# Patient Record
Sex: FEMALE | Race: WHITE | Employment: OTHER | URBAN - METROPOLITAN AREA
[De-identification: names, ages, dates, MRNs, and addresses within clinical notes are randomized per-mention and may not be internally consistent; named-entity substitution may affect disease eponyms.]

---

## 2018-05-07 ENCOUNTER — INITIAL CONSULT (OUTPATIENT)
Dept: SURGERY | Age: 33
End: 2018-05-07

## 2018-05-07 VITALS
WEIGHT: 126 LBS | DIASTOLIC BLOOD PRESSURE: 73 MMHG | SYSTOLIC BLOOD PRESSURE: 128 MMHG | BODY MASS INDEX: 20.34 KG/M2 | HEART RATE: 88 BPM

## 2018-05-07 DIAGNOSIS — K41.90 FEMORAL HERNIA OF RIGHT SIDE: Primary | ICD-10-CM

## 2018-05-07 PROCEDURE — 99203 OFFICE O/P NEW LOW 30 MIN: CPT | Performed by: SURGERY

## 2018-05-08 ENCOUNTER — TELEPHONE (OUTPATIENT)
Dept: SURGERY | Age: 33
End: 2018-05-08

## 2018-05-11 ENCOUNTER — TELEPHONE (OUTPATIENT)
Dept: SURGERY | Age: 33
End: 2018-05-11

## 2018-05-14 ENCOUNTER — PAT TELEPHONE (OUTPATIENT)
Dept: PREADMISSION TESTING | Age: 33
End: 2018-05-14

## 2018-05-14 VITALS — BODY MASS INDEX: 20.25 KG/M2 | HEIGHT: 66 IN | WEIGHT: 126 LBS

## 2018-05-14 RX ORDER — TRAMADOL HYDROCHLORIDE 50 MG/1
50 TABLET ORAL EVERY 6 HOURS PRN
COMMUNITY
End: 2018-05-16 | Stop reason: HOSPADM

## 2018-05-14 RX ORDER — DEXTROAMPHETAMINE SACCHARATE, AMPHETAMINE ASPARTATE MONOHYDRATE, DEXTROAMPHETAMINE SULFATE AND AMPHETAMINE SULFATE 5; 5; 5; 5 MG/1; MG/1; MG/1; MG/1
20 CAPSULE, EXTENDED RELEASE ORAL EVERY MORNING
COMMUNITY
End: 2022-05-27

## 2018-05-14 ASSESSMENT — PAIN DESCRIPTION - LOCATION: LOCATION: ABDOMEN;BACK

## 2018-05-14 ASSESSMENT — PAIN - FUNCTIONAL ASSESSMENT: PAIN_FUNCTIONAL_ASSESSMENT: 0-10

## 2018-05-14 ASSESSMENT — PAIN SCALES - GENERAL: PAINLEVEL_OUTOF10: 5

## 2018-05-14 ASSESSMENT — PAIN DESCRIPTION - PAIN TYPE: TYPE: CHRONIC PAIN

## 2018-05-16 ENCOUNTER — HOSPITAL ENCOUNTER (OUTPATIENT)
Dept: SURGERY | Age: 33
Discharge: HOME | End: 2018-05-16
Attending: SURGERY | Admitting: SURGERY

## 2018-05-16 VITALS
RESPIRATION RATE: 18 BRPM | WEIGHT: 122.91 LBS | SYSTOLIC BLOOD PRESSURE: 112 MMHG | TEMPERATURE: 97.8 F | OXYGEN SATURATION: 100 % | DIASTOLIC BLOOD PRESSURE: 60 MMHG | HEART RATE: 54 BPM | HEIGHT: 66 IN | BODY MASS INDEX: 19.75 KG/M2

## 2018-05-16 DIAGNOSIS — K40.91 RECURRENT RIGHT INGUINAL HERNIA: Primary | ICD-10-CM

## 2018-05-16 LAB
ANION GAP SERPL CALCULATED.3IONS-SCNC: 12 MMOL/L (ref 3–16)
BUN BLDV-MCNC: 13 MG/DL (ref 7–20)
CALCIUM SERPL-MCNC: 8.9 MG/DL (ref 8.3–10.6)
CHLORIDE BLD-SCNC: 103 MMOL/L (ref 99–110)
CO2: 25 MMOL/L (ref 21–32)
CREAT SERPL-MCNC: 0.7 MG/DL (ref 0.6–1.1)
GFR AFRICAN AMERICAN: >60
GFR NON-AFRICAN AMERICAN: >60
GLUCOSE BLD-MCNC: 100 MG/DL (ref 70–99)
POTASSIUM REFLEX MAGNESIUM: 4.5 MMOL/L (ref 3.5–5.1)
PREGNANCY, URINE: NEGATIVE
SODIUM BLD-SCNC: 140 MMOL/L (ref 136–145)

## 2018-05-16 PROCEDURE — 49651 LAP ING HERNIA REPAIR RECUR: CPT | Performed by: SURGERY

## 2018-05-16 RX ORDER — MIDAZOLAM HYDROCHLORIDE 1 MG/ML
1 INJECTION INTRAMUSCULAR; INTRAVENOUS EVERY 5 MIN PRN
Status: DISCONTINUED | OUTPATIENT
Start: 2018-05-16 | End: 2018-05-17 | Stop reason: HOSPADM

## 2018-05-16 RX ORDER — FENTANYL CITRATE 50 UG/ML
50 INJECTION, SOLUTION INTRAMUSCULAR; INTRAVENOUS EVERY 5 MIN PRN
Status: DISCONTINUED | OUTPATIENT
Start: 2018-05-16 | End: 2018-05-17 | Stop reason: HOSPADM

## 2018-05-16 RX ORDER — SODIUM CHLORIDE 9 MG/ML
INJECTION, SOLUTION INTRAVENOUS CONTINUOUS
Status: DISCONTINUED | OUTPATIENT
Start: 2018-05-16 | End: 2018-05-17 | Stop reason: HOSPADM

## 2018-05-16 RX ORDER — ONDANSETRON 2 MG/ML
4 INJECTION INTRAMUSCULAR; INTRAVENOUS ONCE
Status: COMPLETED | OUTPATIENT
Start: 2018-05-16 | End: 2018-05-16

## 2018-05-16 RX ORDER — OXYCODONE HYDROCHLORIDE AND ACETAMINOPHEN 5; 325 MG/1; MG/1
1 TABLET ORAL
Status: COMPLETED | OUTPATIENT
Start: 2018-05-16 | End: 2018-05-16

## 2018-05-16 RX ORDER — SODIUM CHLORIDE 0.9 % (FLUSH) 0.9 %
10 SYRINGE (ML) INJECTION EVERY 12 HOURS SCHEDULED
Status: DISCONTINUED | OUTPATIENT
Start: 2018-05-16 | End: 2018-05-17 | Stop reason: HOSPADM

## 2018-05-16 RX ORDER — ONDANSETRON 4 MG/1
4 TABLET, FILM COATED ORAL EVERY 8 HOURS PRN
Qty: 15 TABLET | Refills: 0 | Status: SHIPPED | OUTPATIENT
Start: 2018-05-16 | End: 2022-03-24

## 2018-05-16 RX ORDER — CLONAZEPAM 0.5 MG/1
0.5 TABLET ORAL ONCE
Status: COMPLETED | OUTPATIENT
Start: 2018-05-16 | End: 2018-05-16

## 2018-05-16 RX ORDER — FENTANYL CITRATE 50 UG/ML
25 INJECTION, SOLUTION INTRAMUSCULAR; INTRAVENOUS EVERY 5 MIN PRN
Status: DISCONTINUED | OUTPATIENT
Start: 2018-05-16 | End: 2018-05-17 | Stop reason: HOSPADM

## 2018-05-16 RX ORDER — SODIUM CHLORIDE 0.9 % (FLUSH) 0.9 %
10 SYRINGE (ML) INJECTION PRN
Status: DISCONTINUED | OUTPATIENT
Start: 2018-05-16 | End: 2018-05-17 | Stop reason: HOSPADM

## 2018-05-16 RX ORDER — OXYCODONE HYDROCHLORIDE AND ACETAMINOPHEN 5; 325 MG/1; MG/1
1-2 TABLET ORAL EVERY 6 HOURS PRN
Qty: 28 TABLET | Refills: 0 | Status: SHIPPED | OUTPATIENT
Start: 2018-05-16 | End: 2018-05-23

## 2018-05-16 RX ORDER — MEPERIDINE HYDROCHLORIDE 25 MG/ML
12.5 INJECTION INTRAMUSCULAR; INTRAVENOUS; SUBCUTANEOUS EVERY 5 MIN PRN
Status: DISCONTINUED | OUTPATIENT
Start: 2018-05-16 | End: 2018-05-17 | Stop reason: HOSPADM

## 2018-05-16 RX ORDER — DOCUSATE SODIUM 100 MG/1
100 CAPSULE, LIQUID FILLED ORAL 2 TIMES DAILY PRN
Qty: 40 CAPSULE | Refills: 0 | Status: SHIPPED | OUTPATIENT
Start: 2018-05-16 | End: 2022-05-27

## 2018-05-16 RX ORDER — ONDANSETRON 2 MG/ML
4 INJECTION INTRAMUSCULAR; INTRAVENOUS
Status: COMPLETED | OUTPATIENT
Start: 2018-05-16 | End: 2018-05-16

## 2018-05-16 RX ADMIN — OXYCODONE HYDROCHLORIDE AND ACETAMINOPHEN 1 TABLET: 5; 325 TABLET ORAL at 15:05

## 2018-05-16 RX ADMIN — SODIUM CHLORIDE: 9 INJECTION, SOLUTION INTRAVENOUS at 10:18

## 2018-05-16 RX ADMIN — ONDANSETRON 4 MG: 2 INJECTION INTRAMUSCULAR; INTRAVENOUS at 13:56

## 2018-05-16 RX ADMIN — ONDANSETRON 4 MG: 2 INJECTION INTRAMUSCULAR; INTRAVENOUS at 15:01

## 2018-05-16 RX ADMIN — FENTANYL CITRATE 50 MCG: 50 INJECTION, SOLUTION INTRAMUSCULAR; INTRAVENOUS at 13:51

## 2018-05-16 RX ADMIN — CLONAZEPAM 0.5 MG: 0.5 TABLET ORAL at 15:44

## 2018-05-16 RX ADMIN — FENTANYL CITRATE 50 MCG: 50 INJECTION, SOLUTION INTRAMUSCULAR; INTRAVENOUS at 13:12

## 2018-05-16 ASSESSMENT — PAIN - FUNCTIONAL ASSESSMENT: PAIN_FUNCTIONAL_ASSESSMENT: 0-10

## 2018-05-16 ASSESSMENT — PAIN SCALES - GENERAL
PAINLEVEL_OUTOF10: 8
PAINLEVEL_OUTOF10: 6
PAINLEVEL_OUTOF10: 10
PAINLEVEL_OUTOF10: 6
PAINLEVEL_OUTOF10: 6
PAINLEVEL_OUTOF10: 7

## 2018-05-16 ASSESSMENT — PAIN DESCRIPTION - DESCRIPTORS: DESCRIPTORS: ACHING;JABBING

## 2018-05-16 ASSESSMENT — PAIN DESCRIPTION - LOCATION: LOCATION: ABDOMEN

## 2018-05-16 ASSESSMENT — PAIN DESCRIPTION - PAIN TYPE
TYPE: SURGICAL PAIN
TYPE: SURGICAL PAIN
TYPE: ACUTE PAIN;SURGICAL PAIN

## 2018-05-17 ENCOUNTER — TELEPHONE (OUTPATIENT)
Dept: SURGERY | Age: 33
End: 2018-05-17

## 2018-05-17 LAB
HCT VFR BLD CALC: 34.3 % (ref 36–48)
HEMATOLOGY PATH CONSULT: NORMAL
HEMATOLOGY PATH CONSULT: YES
HEMOGLOBIN: 11.5 G/DL (ref 12–16)
MCH RBC QN AUTO: 23.2 PG (ref 26–34)
MCHC RBC AUTO-ENTMCNC: 33.4 G/DL (ref 31–36)
MCV RBC AUTO: 69.4 FL (ref 80–100)
PDW BLD-RTO: 17.8 % (ref 12.4–15.4)
PLATELET # BLD: 217 K/UL (ref 135–450)
PMV BLD AUTO: 9.1 FL (ref 5–10.5)
RBC # BLD: 4.95 M/UL (ref 4–5.2)
WBC # BLD: 4.7 K/UL (ref 4–11)

## 2018-05-18 ENCOUNTER — TELEPHONE (OUTPATIENT)
Dept: SURGERY | Age: 33
End: 2018-05-18

## 2018-05-22 ENCOUNTER — TELEPHONE (OUTPATIENT)
Dept: SURGERY | Age: 33
End: 2018-05-22

## 2018-06-01 ENCOUNTER — OFFICE VISIT (OUTPATIENT)
Dept: SURGERY | Age: 33
End: 2018-06-01

## 2018-06-01 VITALS
SYSTOLIC BLOOD PRESSURE: 110 MMHG | HEART RATE: 72 BPM | DIASTOLIC BLOOD PRESSURE: 75 MMHG | BODY MASS INDEX: 20.01 KG/M2 | WEIGHT: 124 LBS

## 2018-06-01 DIAGNOSIS — K40.91 RECURRENT RIGHT INGUINAL HERNIA: Primary | ICD-10-CM

## 2018-06-01 DIAGNOSIS — Z87.19 S/P LAPAROSCOPIC HERNIA REPAIR: ICD-10-CM

## 2018-06-01 DIAGNOSIS — Z98.890 S/P LAPAROSCOPIC HERNIA REPAIR: ICD-10-CM

## 2018-06-01 PROCEDURE — 99024 POSTOP FOLLOW-UP VISIT: CPT | Performed by: SURGERY

## 2018-11-30 ENCOUNTER — HOSPITAL ENCOUNTER (OUTPATIENT)
Dept: ULTRASOUND IMAGING | Age: 33
Discharge: HOME | End: 2018-11-30
Payer: MEDICARE

## 2018-11-30 DIAGNOSIS — R10.30 LOWER ABDOMINAL PAIN: ICD-10-CM

## 2018-11-30 PROCEDURE — 76700 US EXAM ABDOM COMPLETE: CPT

## 2018-11-30 PROCEDURE — 76830 TRANSVAGINAL US NON-OB: CPT

## 2018-11-30 PROCEDURE — 76856 US EXAM PELVIC COMPLETE: CPT

## 2019-02-05 ENCOUNTER — HOSPITAL ENCOUNTER (OUTPATIENT)
Age: 34
Discharge: HOME | End: 2019-02-05
Payer: MEDICARE

## 2019-02-05 ENCOUNTER — HOSPITAL ENCOUNTER (OUTPATIENT)
Dept: GENERAL RADIOLOGY | Age: 34
Discharge: HOME | End: 2019-02-05
Payer: MEDICARE

## 2019-02-05 DIAGNOSIS — R09.89 CHEST CONGESTION: ICD-10-CM

## 2019-02-05 DIAGNOSIS — J18.9 PNEUMONIA DUE TO INFECTIOUS ORGANISM, UNSPECIFIED LATERALITY, UNSPECIFIED PART OF LUNG: ICD-10-CM

## 2019-02-05 PROCEDURE — 71046 X-RAY EXAM CHEST 2 VIEWS: CPT

## 2019-07-23 ENCOUNTER — HOSPITAL ENCOUNTER (OUTPATIENT)
Dept: CT IMAGING | Age: 34
Discharge: HOME | End: 2019-07-23
Payer: MEDICARE

## 2019-07-23 DIAGNOSIS — R59.0 CERVICAL LYMPHADENOPATHY: ICD-10-CM

## 2019-07-23 PROCEDURE — 74177 CT ABD & PELVIS W/CONTRAST: CPT

## 2019-07-23 PROCEDURE — 6360000004 HC RX CONTRAST MEDICATION: Performed by: INTERNAL MEDICINE

## 2019-07-23 RX ADMIN — IOPAMIDOL 75 ML: 755 INJECTION, SOLUTION INTRAVENOUS at 10:19

## 2019-07-23 RX ADMIN — IOHEXOL 50 ML: 240 INJECTION, SOLUTION INTRATHECAL; INTRAVASCULAR; INTRAVENOUS; ORAL at 10:19

## 2019-08-06 ENCOUNTER — HOSPITAL ENCOUNTER (OUTPATIENT)
Dept: GENERAL RADIOLOGY | Age: 34
Discharge: HOME | End: 2019-08-06
Payer: MEDICARE

## 2019-08-06 ENCOUNTER — HOSPITAL ENCOUNTER (OUTPATIENT)
Age: 34
Discharge: HOME | End: 2019-08-06
Payer: MEDICARE

## 2019-08-06 DIAGNOSIS — M25.512 ACUTE PAIN OF LEFT SHOULDER: ICD-10-CM

## 2019-08-06 PROCEDURE — 71101 X-RAY EXAM UNILAT RIBS/CHEST: CPT

## 2019-08-06 PROCEDURE — 73030 X-RAY EXAM OF SHOULDER: CPT

## 2021-08-20 ENCOUNTER — HOSPITAL ENCOUNTER (OUTPATIENT)
Age: 36
Discharge: HOME | End: 2021-08-20
Payer: MEDICAID

## 2021-08-20 ENCOUNTER — HOSPITAL ENCOUNTER (OUTPATIENT)
Dept: GENERAL RADIOLOGY | Age: 36
Discharge: HOME | End: 2021-08-20
Payer: MEDICAID

## 2021-08-20 DIAGNOSIS — M79.672 LEFT FOOT PAIN: ICD-10-CM

## 2021-08-20 PROCEDURE — 73630 X-RAY EXAM OF FOOT: CPT

## 2021-12-21 ENCOUNTER — HOSPITAL ENCOUNTER (OUTPATIENT)
Age: 36
Discharge: HOME | End: 2021-12-21
Payer: MEDICAID

## 2021-12-21 ENCOUNTER — HOSPITAL ENCOUNTER (OUTPATIENT)
Dept: GENERAL RADIOLOGY | Age: 36
Discharge: HOME | End: 2021-12-21
Payer: MEDICAID

## 2021-12-21 DIAGNOSIS — R06.02 SHORTNESS OF BREATH: ICD-10-CM

## 2021-12-21 PROCEDURE — 71046 X-RAY EXAM CHEST 2 VIEWS: CPT

## 2021-12-23 ENCOUNTER — HOSPITAL ENCOUNTER (EMERGENCY)
Age: 36
Discharge: HOME | End: 2021-12-23
Attending: EMERGENCY MEDICINE
Payer: MEDICAID

## 2021-12-23 ENCOUNTER — APPOINTMENT (OUTPATIENT)
Dept: GENERAL RADIOLOGY | Age: 36
End: 2021-12-23
Payer: MEDICAID

## 2021-12-23 VITALS
HEIGHT: 66 IN | SYSTOLIC BLOOD PRESSURE: 110 MMHG | HEART RATE: 62 BPM | RESPIRATION RATE: 16 BRPM | TEMPERATURE: 97.7 F | DIASTOLIC BLOOD PRESSURE: 70 MMHG | BODY MASS INDEX: 20.12 KG/M2 | OXYGEN SATURATION: 100 % | WEIGHT: 125.22 LBS

## 2021-12-23 DIAGNOSIS — J02.9 VIRAL PHARYNGITIS: ICD-10-CM

## 2021-12-23 DIAGNOSIS — R07.81 PLEURITIC CHEST PAIN: Primary | ICD-10-CM

## 2021-12-23 LAB
ANION GAP SERPL CALCULATED.3IONS-SCNC: 10 MMOL/L (ref 3–16)
BASOPHILS ABSOLUTE: 0.1 K/UL (ref 0–0.2)
BASOPHILS RELATIVE PERCENT: 1.4 %
BILIRUBIN URINE: NEGATIVE
BLOOD, URINE: NEGATIVE
BUN BLDV-MCNC: 7 MG/DL (ref 7–20)
CALCIUM SERPL-MCNC: 8.8 MG/DL (ref 8.3–10.6)
CHLORIDE BLD-SCNC: 107 MMOL/L (ref 99–110)
CLARITY: CLEAR
CO2: 21 MMOL/L (ref 21–32)
COLOR: YELLOW
CREAT SERPL-MCNC: 0.7 MG/DL (ref 0.6–1.1)
EKG ATRIAL RATE: 54 BPM
EKG DIAGNOSIS: NORMAL
EKG P AXIS: 59 DEGREES
EKG P-R INTERVAL: 166 MS
EKG Q-T INTERVAL: 428 MS
EKG QRS DURATION: 86 MS
EKG QTC CALCULATION (BAZETT): 405 MS
EKG R AXIS: 75 DEGREES
EKG T AXIS: 55 DEGREES
EKG VENTRICULAR RATE: 54 BPM
EOSINOPHILS ABSOLUTE: 0.2 K/UL (ref 0–0.6)
EOSINOPHILS RELATIVE PERCENT: 4.3 %
GFR AFRICAN AMERICAN: >60
GFR NON-AFRICAN AMERICAN: >60
GLUCOSE BLD-MCNC: 107 MG/DL (ref 70–99)
GLUCOSE URINE: NEGATIVE MG/DL
HCT VFR BLD CALC: 37.5 % (ref 36–48)
HEMATOLOGY PATH CONSULT: NO
HEMOGLOBIN: 11.9 G/DL (ref 12–16)
KETONES, URINE: NEGATIVE MG/DL
LEUKOCYTE ESTERASE, URINE: NEGATIVE
LYMPHOCYTES ABSOLUTE: 1 K/UL (ref 1–5.1)
LYMPHOCYTES RELATIVE PERCENT: 23.4 %
MCH RBC QN AUTO: 21.8 PG (ref 26–34)
MCHC RBC AUTO-ENTMCNC: 31.7 G/DL (ref 31–36)
MCV RBC AUTO: 68.8 FL (ref 80–100)
MICROSCOPIC EXAMINATION: NORMAL
MONOCYTES ABSOLUTE: 0.7 K/UL (ref 0–1.3)
MONOCYTES RELATIVE PERCENT: 16 %
NEUTROPHILS ABSOLUTE: 2.3 K/UL (ref 1.7–7.7)
NEUTROPHILS RELATIVE PERCENT: 54.9 %
NITRITE, URINE: NEGATIVE
PDW BLD-RTO: 16.3 % (ref 12.4–15.4)
PH UA: 7 (ref 5–8)
PLATELET # BLD: 192 K/UL (ref 135–450)
PMV BLD AUTO: 8.8 FL (ref 5–10.5)
POTASSIUM REFLEX MAGNESIUM: 4.3 MMOL/L (ref 3.5–5.1)
PROTEIN UA: NEGATIVE MG/DL
RAPID INFLUENZA  B AGN: NEGATIVE
RAPID INFLUENZA A AGN: NEGATIVE
RBC # BLD: 5.45 M/UL (ref 4–5.2)
SARS-COV-2, PCR: NOT DETECTED
SODIUM BLD-SCNC: 138 MMOL/L (ref 136–145)
SPECIFIC GRAVITY UA: 1.01 (ref 1–1.03)
TROPONIN: <0.01 NG/ML
URINE REFLEX TO CULTURE: NORMAL
URINE TYPE: NORMAL
UROBILINOGEN, URINE: 1 E.U./DL
WBC # BLD: 4.2 K/UL (ref 4–11)

## 2021-12-23 PROCEDURE — 36415 COLL VENOUS BLD VENIPUNCTURE: CPT

## 2021-12-23 PROCEDURE — U0003 INFECTIOUS AGENT DETECTION BY NUCLEIC ACID (DNA OR RNA); SEVERE ACUTE RESPIRATORY SYNDROME CORONAVIRUS 2 (SARS-COV-2) (CORONAVIRUS DISEASE [COVID-19]), AMPLIFIED PROBE TECHNIQUE, MAKING USE OF HIGH THROUGHPUT TECHNOLOGIES AS DESCRIBED BY CMS-2020-01-R: HCPCS

## 2021-12-23 PROCEDURE — 71046 X-RAY EXAM CHEST 2 VIEWS: CPT

## 2021-12-23 PROCEDURE — 80048 BASIC METABOLIC PNL TOTAL CA: CPT

## 2021-12-23 PROCEDURE — 81003 URINALYSIS AUTO W/O SCOPE: CPT

## 2021-12-23 PROCEDURE — 84484 ASSAY OF TROPONIN QUANT: CPT

## 2021-12-23 PROCEDURE — 87804 INFLUENZA ASSAY W/OPTIC: CPT

## 2021-12-23 PROCEDURE — 93010 ELECTROCARDIOGRAM REPORT: CPT | Performed by: INTERNAL MEDICINE

## 2021-12-23 PROCEDURE — 99285 EMERGENCY DEPT VISIT HI MDM: CPT

## 2021-12-23 PROCEDURE — 6370000000 HC RX 637 (ALT 250 FOR IP): Performed by: EMERGENCY MEDICINE

## 2021-12-23 PROCEDURE — U0005 INFEC AGEN DETEC AMPLI PROBE: HCPCS

## 2021-12-23 PROCEDURE — 93005 ELECTROCARDIOGRAM TRACING: CPT | Performed by: EMERGENCY MEDICINE

## 2021-12-23 PROCEDURE — 85025 COMPLETE CBC W/AUTO DIFF WBC: CPT

## 2021-12-23 RX ORDER — ACETAMINOPHEN 500 MG
1000 TABLET ORAL ONCE
Status: COMPLETED | OUTPATIENT
Start: 2021-12-23 | End: 2021-12-23

## 2021-12-23 RX ADMIN — ACETAMINOPHEN 1000 MG: 500 TABLET ORAL at 12:41

## 2021-12-23 ASSESSMENT — PAIN SCALES - GENERAL
PAINLEVEL_OUTOF10: 6
PAINLEVEL_OUTOF10: 6
PAINLEVEL_OUTOF10: 0
PAINLEVEL_OUTOF10: 0

## 2021-12-23 ASSESSMENT — PAIN DESCRIPTION - DESCRIPTORS: DESCRIPTORS: SHARP

## 2021-12-23 ASSESSMENT — PAIN DESCRIPTION - FREQUENCY: FREQUENCY: CONTINUOUS

## 2021-12-23 ASSESSMENT — PAIN DESCRIPTION - LOCATION: LOCATION: CHEST

## 2021-12-23 ASSESSMENT — PAIN DESCRIPTION - PAIN TYPE
TYPE: ACUTE PAIN
TYPE: ACUTE PAIN

## 2021-12-23 ASSESSMENT — PAIN DESCRIPTION - ONSET: ONSET: ON-GOING

## 2021-12-23 ASSESSMENT — PAIN DESCRIPTION - PROGRESSION: CLINICAL_PROGRESSION: NOT CHANGED

## 2021-12-23 NOTE — ED PROVIDER NOTES
CHIEF COMPLAINT  Chest Pain      HISTORY OF PRESENT ILLNESS  Regina Carmichael is a 43 y.o. female who  has a past medical history of Anxiety, Autoimmune disease ([address] Broken jaw ([address] Bronchiolitis, H/O renal failure, History of kidney stones, IBS (irritable bowel syndrome), Psychogenic spell, Pyelonephritis, Seizures ([address] Thalassemia minor, and Visceral hypersensitivity syndrome. presents to the ED complaining of substernal chest pain that started approximately 30 minutes prior to arrival.  Patient states she was sipping on some coffee when the chest pain started. States it was a sharp pain that did not radiate. States the pain was constant until she got to the hospital.  Patient states that after being here for a few minutes that the pain subsided. Denies any previous cardiac disease. States she has been battling laryngitis recently. Denies any shortness of breath. No nausea or vomiting. No fevers or chills. No abdominal pain. Normal urinary and bowel habits. No recent travel, prolonged immobilization, unilateral leg pain or swelling, history of DVT/PE, oral hormone replacement or recent surgery. No other complaints, modifying factors or associated symptoms. Nursing notes reviewed. Past Medical History:   Diagnosis Date    Anxiety     Autoimmune disease ([address]     Broken jaw ([address]     titanum plate    Bronchiolitis     H/O renal failure 2013    left kidney    History of kidney stones     IBS (irritable bowel syndrome)     Psychogenic spell 7/12/2014     reviewed UC note, they dx as nonepileptic seizures    Pyelonephritis     Seizures ([address]     last seizure one month ago. ...temporal lobe epilepsy    Thalassemia minor     Visceral hypersensitivity syndrome      Past Surgical History:   Procedure Laterality Date    CHOLECYSTECTOMY      CYST REMOVAL      HERNIA REPAIR      right inguinal    LITHOTRIPSY      OTHER SURGICAL HISTORY      titanum plate upper jaw d/t fracture    TONSILLECTOMY      UPPER GASTROINTESTINAL ENDOSCOPY  11/05/2014    biopsies     History reviewed. No pertinent family history. Social History     Socioeconomic History    Marital status:      Spouse name: Not on file    Number of children: Not on file    Years of education: Not on file    Highest education level: Not on file   Occupational History    Not on file   Tobacco Use    Smoking status: Current Every Day Smoker     Packs/day: 0.50     Years: 15.00     Pack years: 7.50     Types: Cigarettes    Smokeless tobacco: Never Used   Vaping Use    Vaping Use: Never used   Substance and Sexual Activity    Alcohol use: No    Drug use: No    Sexual activity: Yes   Other Topics Concern    Not on file   Social History Narrative    Not on file     Social Determinants of Health     Financial Resource Strain:     Difficulty of Paying Living Expenses: Not on file   Food Insecurity:     Worried About Running Out of Food in the Last Year: Not on file    Alyssia of Food in the Last Year: Not on file   Transportation Needs:     Lack of Transportation (Medical): Not on file    Lack of Transportation (Non-Medical):  Not on file   Physical Activity:     Days of Exercise per Week: Not on file    Minutes of Exercise per Session: Not on file   Stress:     Feeling of Stress : Not on file   Social Connections:     Frequency of Communication with Friends and Family: Not on file    Frequency of Social Gatherings with Friends and Family: Not on file    Attends Mormonism Services: Not on file    Active Member of Clubs or Organizations: Not on file    Attends Club or Organization Meetings: Not on file    Marital Status: Not on file   Intimate Partner Violence:     Fear of Current or Ex-Partner: Not on file    Emotionally Abused: Not on file    Physically Abused: Not on file    Sexually Abused: Not on file   Housing Stability:     Unable to Pay for Housing in the Last Year: Not on file    Number of Sudafed [Pseudoephedrine Hcl]          REVIEW OF SYSTEMS  10 systems reviewed, pertinent positives per HPI otherwise noted to be negative    PHYSICAL EXAM  /73   Pulse 53   Temp 97.7 °F (36.5 °C) (Oral)   Resp 20   Ht 5' 6\" (1.676 m)   Wt 125 lb 3.5 oz (56.8 kg)   SpO2 100%   BMI 20.21 kg/m²      CONSTITUTIONAL: AOx4, cooperative with exam, afebrile   HEAD: normocephalic, atraumatic   EYES: PERRL, EOMI, anicteric sclera   ENT: Moist mucous membranes, uvula midline, raspy voice   LUNGS: Bilateral breath sounds, CTAB, no rales/ronchi/wheezes   CARDIOVASCULAR: RRR, normal S1/S2, no m/r/g, 2+ pulses throughout   ABDOMEN: Soft, non-tender, non-distended, +BS   NEUROLOGIC:  MAEx4, GCS 15   MUSCULOSKELETAL: No clubbing, cyanosis or edema no calf tenderness bilaterally   SKIN: No rash, pallor or wounds on exposed surfaces         RADIOLOGY  X-RAYS:  I have reviewed radiologic plain film image(s). ALL OTHER NON-PLAIN FILM IMAGES SUCH AS CT, ULTRASOUND AND MRI HAVE BEEN READ BY THE RADIOLOGIST. XR CHEST (2 VW)   Preliminary Result   No acute cardiopulmonary disease. EKG INTERPRETATION  Sinus bradycardia with a rate of 54, normal axis, , QRS 86, QTc 405, no ST elevations, no evidence of acute ischemia, no acute change compared EKG from 6/29/2015    PROCEDURES    ED COURSE/MDM  Pleuritic chest pain, esophageal spasm, costochondritis, GERD, esophagitis, ACS/MI, PUD  Patient seen and evaluated. History and physical as above. Nontoxic, afebrile. Patient with complaints of chest pain. Patient's chest pain did resolve after being in the ED for short period of time. Did recently have laryngitis. Will obtain routine labs, troponin, EKG, chest x-ray, influenza swab, urinalysis and Covid test.  Lungs clear auscultation bilaterally. O2 saturation on percent room air. No tachycardia or fever. No risk factors for PE. Patient PERC negative.   ED Course as of 12/23/21 1409   Thu Dec 23, 2021 1408 Flu swab negative. Troponin negative. Chest x-ray unremarkable. Routine labs within normal limits. Urinalysis negative for infection. Covid test pending. Patient updated on results. States she is feeling much better. Discussed discharge with outpatient follow-up. Patient agreeable. Return instruction provided. All questions answered prior to discharge. [DS]      ED Course User Index  [DS] Tamanna Neri MD       I estimate there is LOW risk for PULMONARY EMBOLISM, ACUTE CORONARY SYNDROME, OR THORACIC AORTIC DISSECTION, thus I consider the discharge disposition reasonable. Wolfgang Morse and I have discussed the diagnosis and risks, and we agree with discharging home to follow-up with their primary doctor. We also discussed returning to the Emergency Department immediately if new or worsening symptoms occur. We have discussed the symptoms which are most concerning (e.g., bloody sputum, fever, worsening pain or shortness of breath, vomiting) that necessitate immediate return. Patient was given scripts for the following medications. I counseled patient how to take these medications. New Prescriptions    No medications on file           CLINICAL IMPRESSION  1. Pleuritic chest pain    2. Viral pharyngitis        Blood pressure 113/73, pulse 53, temperature 97.7 °F (36.5 °C), temperature source Oral, resp. rate 20, height 5' 6\" (1.676 m), weight 125 lb 3.5 oz (56.8 kg), SpO2 100 %. DISPOSITION  Patient was discharged to home in good condition. TAYA Varma - CNP  [address]  [phone]    Call today  For a follow up appointment. Disclaimer: All medical record entries made by [address] [place] [address] [address] [facility].       (Please note that this note was completed with a voice recognition program. Every attempt was made to edit the dictations, but inevitably there remain words that are mis-transcribed.)            Tamanna Neri MD  12/23/21 7030

## 2021-12-23 NOTE — ED TRIAGE NOTES
Pt reports a sudden onset of sharp pain to the middle of her chest. Complains of cough congestion , body aches for 2 weeks. Reports that she was tested for covid 19 4 days ago that was negative.

## 2021-12-23 NOTE — ED NOTES
Bed: [facility]  Expected date:   Expected time:   Means of arrival: [facility]  Comments:  36F Chest pain     Roxana Light RN  12/23/21 7004

## 2022-03-24 ENCOUNTER — OFFICE VISIT (OUTPATIENT)
Dept: PRIMARY CARE CLINIC | Age: 37
End: 2022-03-24
Payer: MEDICAID

## 2022-03-24 VITALS
WEIGHT: 135 LBS | OXYGEN SATURATION: 98 % | HEART RATE: 87 BPM | SYSTOLIC BLOOD PRESSURE: 102 MMHG | BODY MASS INDEX: 21.69 KG/M2 | HEIGHT: 66 IN | DIASTOLIC BLOOD PRESSURE: 68 MMHG

## 2022-03-24 DIAGNOSIS — Z86.69 HX OF SEIZURE DISORDER: Primary | ICD-10-CM

## 2022-03-24 DIAGNOSIS — M53.3 COCCYXDYNIA: ICD-10-CM

## 2022-03-24 DIAGNOSIS — Z13.228 ENCOUNTER FOR SCREENING FOR OTHER METABOLIC DISORDERS: ICD-10-CM

## 2022-03-24 DIAGNOSIS — Z13.220 SCREENING FOR LIPID DISORDERS: ICD-10-CM

## 2022-03-24 DIAGNOSIS — Z13.1 SCREENING FOR DIABETES MELLITUS: ICD-10-CM

## 2022-03-24 DIAGNOSIS — Z92.0 HISTORY OF USE OF CONTRACEPTIVE INTRAUTERINE DEVICE (IUD): ICD-10-CM

## 2022-03-24 DIAGNOSIS — R10.9 FLANK PAIN: ICD-10-CM

## 2022-03-24 DIAGNOSIS — Z11.59 NEED FOR HEPATITIS C SCREENING TEST: ICD-10-CM

## 2022-03-24 DIAGNOSIS — M25.50 POLYARTHRALGIA: ICD-10-CM

## 2022-03-24 DIAGNOSIS — Z13.29 SCREENING FOR THYROID DISORDER: ICD-10-CM

## 2022-03-24 PROBLEM — M25.551 RIGHT HIP PAIN: Status: ACTIVE | Noted: 2017-05-04

## 2022-03-24 PROBLEM — F41.1 GENERALIZED ANXIETY DISORDER: Status: ACTIVE | Noted: 2022-03-24

## 2022-03-24 PROBLEM — D56.3 THALASSEMIA TRAIT: Status: ACTIVE | Noted: 2022-03-24

## 2022-03-24 PROBLEM — N20.0 KIDNEY STONES: Status: ACTIVE | Noted: 2022-03-24

## 2022-03-24 LAB
BILIRUBIN, POC: NORMAL
BLOOD URINE, POC: NORMAL
CLARITY, POC: CLEAR
COLOR, POC: NORMAL
GLUCOSE URINE, POC: NORMAL
KETONES, POC: NORMAL
LEUKOCYTE EST, POC: NORMAL
NITRITE, POC: NORMAL
PH, POC: 6
PROTEIN, POC: NORMAL
SPECIFIC GRAVITY, POC: 1.03
UROBILINOGEN, POC: 0.2

## 2022-03-24 PROCEDURE — G8427 DOCREV CUR MEDS BY ELIG CLIN: HCPCS | Performed by: NURSE PRACTITIONER

## 2022-03-24 PROCEDURE — 99205 OFFICE O/P NEW HI 60 MIN: CPT | Performed by: NURSE PRACTITIONER

## 2022-03-24 PROCEDURE — G8420 CALC BMI NORM PARAMETERS: HCPCS | Performed by: NURSE PRACTITIONER

## 2022-03-24 PROCEDURE — 81002 URINALYSIS NONAUTO W/O SCOPE: CPT | Performed by: NURSE PRACTITIONER

## 2022-03-24 PROCEDURE — G8484 FLU IMMUNIZE NO ADMIN: HCPCS | Performed by: NURSE PRACTITIONER

## 2022-03-24 PROCEDURE — 4004F PT TOBACCO SCREEN RCVD TLK: CPT | Performed by: NURSE PRACTITIONER

## 2022-03-24 RX ORDER — TRAMADOL HYDROCHLORIDE 50 MG/1
TABLET ORAL
COMMUNITY
Start: 2022-03-11

## 2022-03-24 RX ORDER — DIAZEPAM 5 MG/1
5 TABLET ORAL EVERY 6 HOURS PRN
COMMUNITY
End: 2022-05-27

## 2022-03-24 ASSESSMENT — ENCOUNTER SYMPTOMS
ABDOMINAL PAIN: 0
VOMITING: 0
FACIAL SWELLING: 0
SINUS PAIN: 0
EYE PAIN: 0
NAUSEA: 0
SHORTNESS OF BREATH: 0
WHEEZING: 0
TROUBLE SWALLOWING: 0
SORE THROAT: 0
CHEST TIGHTNESS: 0
COUGH: 0
DIARRHEA: 1
CONSTIPATION: 0
BLOOD IN STOOL: 0

## 2022-03-24 ASSESSMENT — PATIENT HEALTH QUESTIONNAIRE - PHQ9
SUM OF ALL RESPONSES TO PHQ QUESTIONS 1-9: 0
2. FEELING DOWN, DEPRESSED OR HOPELESS: 0
1. LITTLE INTEREST OR PLEASURE IN DOING THINGS: 0
SUM OF ALL RESPONSES TO PHQ QUESTIONS 1-9: 0
SUM OF ALL RESPONSES TO PHQ9 QUESTIONS 1 & 2: 0
SUM OF ALL RESPONSES TO PHQ QUESTIONS 1-9: 0
SUM OF ALL RESPONSES TO PHQ QUESTIONS 1-9: 0

## 2022-03-24 NOTE — PATIENT INSTRUCTIONS
Patient Education        Coccyx Pain: Care Instructions  Your Care Instructions     The coccyx is your tailbone. You can have pain in your tailbone from a fall or other injury. Pregnancy and childbirth also can cause tailbone pain. Sometimes, the cause of pain is not known. A tailbone injury causes pain when you sit, especially when you slump or sit on a hard seat. Straining to have a bowel movement also can be very painful. Tailbone injuries can take several months to heal, but in some cases the pain goes even longer. You can take steps at home to ease the pain. In some cases, a doctor injects a corticosteroid medicine into the coccyx to reduce swelling and pain. Follow-up care is a key part of your treatment and safety. Be sure to make and go to all appointments, and call your doctor if you are having problems. It's also a good idea to know your test results and keep a list of the medicines you take. How can you care for yourself at home? · Take pain medicines exactly as directed. ? If the doctor gave you a prescription medicine for pain, take it as prescribed. ? If you are not taking a prescription pain medicine, take an over-the-counter medicine to reduce pain. · Put ice or a cold pack on your tailbone for 10 to 20 minutes at a time. Try to do this every 1 to 2 hours for the next 3 days (when you are awake) or until the swelling goes down. Put a thin cloth between the ice and your skin. · About 2 or 3 days after your injury, you can alternate ice and heat. To soothe the tailbone area, take a warm bath for 20 minutes, 3 or 4 times a day. · Sit on soft, padded surfaces. A doughnut-shaped pillow can take pressure off the tailbone. · Avoid constipation, because straining to have a bowel movement will increase your tailbone pain. ? Include fruits, vegetables, beans, and whole grains in your diet each day. These foods are high in fiber. ? Drink plenty of fluids.  If you have kidney, heart, or liver disease and have to limit fluids, talk with your doctor before you increase the amount of fluids you drink. ? Get some exercise every day. Build up slowly to 30 to 60 minutes a day on 5 or more days of the week. ? Take a fiber supplement, such as Citrucel or Metamucil, every day if needed. Read and follow all instructions on the label. ? Schedule time each day for a bowel movement. A daily routine may help. Take your time and do not strain when having a bowel movement. · Follow your doctor's directions for stretching and other exercises that might help with pain. When should you call for help? Call 911 anytime you think you may need emergency care. For example, call if:    · You are unable to move a leg at all. Call your doctor now or seek immediate medical care if:    · You have new or worse symptoms in your legs or buttocks. Symptoms may include:  ? Numbness or tingling. ? Weakness. ? Pain.     · You lose bladder or bowel control. Watch closely for changes in your health, and be sure to contact your doctor if:    · You are not getting better as expected. Where can you learn more? Go to https://VENNCOMM.The App3. org and sign in to your Blogic account. Enter L546 in the [employer] box to learn more about \"Coccyx Pain: Care Instructions. \"     If you do not have an account, please click on the \"Sign Up Now\" link. Current as of: July 1, 2021               Content Version: 13.1  © 7784-7246 [employer]. Care instructions adapted under license by [facility] ([facility]). If you have questions about a medical condition or this instruction, always ask your healthcare professional. [place] [address] any warranty or liability for your use of this information. Patient Education        Seizure: Care Instructions  Your Care Instructions     Seizures are caused by abnormal patterns of electrical signals in the brain.  They are different for each person. Seizures can affect movement, speech, vision, or awareness. Some people have only slight shaking of a hand and do not pass out. Other people may pass out and have violent shaking of the whole body. Some people appear to stare into space. They are awake, but they can't respond normally. Later, they may not remember what happened. You may need tests to identify the type and cause of the seizures. A seizure may occur only once, or you may have them more than one time. Taking medicines as directed and following up with your doctor may help keep you from having more seizures. The doctor has checked you carefully, but problems can develop later. If you notice any problems or new symptoms, get medical treatment right away. Follow-up care is a key part of your treatment and safety. Be sure to make and go to all appointments, and call your doctor if you are having problems. It's also a good idea to know your test results and keep a list of the medicines you take. How can you care for yourself at home? · Be safe with medicines. Take your medicines exactly as prescribed. Call your doctor if you think you are having a problem with your medicine. · Do not do any activity that could be dangerous to you or others until your doctor says it is safe to do so. For example, do not drive a car, operate machinery, swim, or climb ladders. · Be sure that anyone treating you for any health problem knows that you have had a seizure and what medicines you are taking for it. · Identify and avoid things that may make you more likely to have a seizure. These may include lack of sleep, alcohol or drug use, stress, or not eating. · Make sure you go to your follow-up appointment. When should you call for help? Call 911 anytime you think you may need emergency care. For example, call if:    · You have another seizure.     · You have new symptoms, such as trouble walking, speaking, or thinking clearly.    Call your doctor now or seek immediate medical care if:    · You are not acting normally. Watch closely for changes in your health, and be sure to contact your doctor if you have any problems. Where can you learn more? Go to https://Hypecal.Survela. org and sign in to your Thryve account. Enter M092 in the [employer] box to learn more about \"Seizure: Care Instructions. \"     If you do not have an account, please click on the \"Sign Up Now\" link. Current as of: April 8, 2021               Content Version: 13.1  © 2582-6108 [employer]. Care instructions adapted under license by [facility] ([facility]). If you have questions about a medical condition or this instruction, always ask your healthcare professional. [address] any warranty or liability for your use of this information.

## 2022-03-24 NOTE — PROGRESS NOTES
3/24/2022    Rylee Barnes (:  1985) glenis 43 y.o. female, here for evaluation of the following medical concerns:    HPI       Rylee Barnes is a 43 y.o. for new patient visit and follow up of multiple issues including seizure history, PTSD,IBS, chronic back and joint pain, flank pain. Patient has hx of ADHD, partial epilepsy,kidney stones,  thalassemia trait, chronic pain syndrome. She states she has had an increase in focal seizures since starting PTSD therapy. Occuring once every two weeks. Previously they were occurring once every 6 months to an year. She reports chronic back, hip, shoulder, groin pain. She had several evaluations with ortho for inguinal hernia and CTS. She sees Dr. Marbella Chris at [facility]. Previous neurologist Dr. Sanna Barrios. She is currently on Gabapentin 1200 mg BID, vimpat 200mg BID, Valium 5mg BID, Tramadol 50mg TID, Zofran 8m PRN, Adderall XR 10mg, Cymbalta 40mg. Went to [facility] and infectious disease for  evaluation of inguinal lymphedema, weight loss, night sweats, diarrhea, nausea, rash,  x 2 years found to have hypogammaglobulinemia. She continues to have issues with diarrhea, weight loss, seizures She does not currently follow up with neurology or pain management. Suspected autoimmune disease or visceral hypersensitivity syndrome, and referred to rheumatology but had not followed up . Unclear if she has had colonoscopy, but has remote hx of EGD. Review of Systems   Constitutional: Positive for diaphoresis and unexpected weight change. Negative for chills and fever. HENT: Negative for congestion, ear pain, facial swelling, sinus pain, sore throat and trouble swallowing. Eyes: Negative for pain and visual disturbance. Respiratory: Negative for cough, chest tightness, shortness of breath and wheezing. Cardiovascular: Negative for chest pain, palpitations and leg swelling. Gastrointestinal: Positive for diarrhea.  Negative for abdominal pain, blood in stool, constipation, nausea and vomiting. Endocrine: Negative for polydipsia and polyuria. Genitourinary: Negative for difficulty urinating, hematuria, vaginal bleeding, vaginal discharge and vaginal pain. Musculoskeletal: Positive for arthralgias and back pain. Negative for myalgias. Skin: Negative for pallor and rash. Allergic/Immunologic: Negative for environmental allergies and food allergies. Neurological: Positive for seizures. Negative for dizziness, syncope, weakness, numbness and headaches. Hematological: Negative for adenopathy. Does not bruise/bleed easily. Psychiatric/Behavioral: Negative for dysphoric mood and suicidal ideas. Prior to Visit Medications    Medication Sig Taking? Authorizing Provider   diazePAM (VALIUM) 5 MG tablet Take 5 mg by mouth every 6 hours as needed. Yes Historical Provider, MD   traMADol (ULTRAM) 50 MG tablet TAKE 1 TABLET BY MOUTH THREE TIMES DAILY Yes Historical Provider, MD   levonorgestrel (MIRENA) IUD 52 mg 1 each by IntraUTERine route once Yes Historical Provider, MD   docusate sodium (COLACE) 100 MG capsule Take 1 capsule by mouth 2 times daily as needed for Constipation (take while on narcotic pain medication) Yes Patric Holliday MD   amphetamine-dextroamphetamine (ADDERALL XR) 20 MG extended release capsule Take 20 mg by mouth every morning. . Yes Historical Provider, MD   albuterol (PROVENTIL HFA;VENTOLIN HFA) 108 (90 BASE) MCG/ACT inhaler Use 2 puffs 4 times daily for 7 days then as needed for wheezing. Dispense with Spacer and instruct in use. At patient's preference may use 60 dose MDI. Yes TAYA Espino CNP   lacosamide (VIMPAT) 50 MG TABS tablet Take 150 mg by mouth 2 times daily Yes Historical Provider, MD   DULoxetine (CYMBALTA) 60 MG capsule Take 60 mg by mouth daily Yes Historical Provider, MD   gabapentin (NEURONTIN) 600 MG tablet Take 1,200 mg by mouth 2 times daily.  Yes Historical Provider, MD ondansetron (ZOFRAN) 8 MG tablet Take 8 mg by mouth every 8 hours as needed for Nausea. Yes Historical Provider, MD        Allergies   Allergen Reactions    Flexeril [Cyclobenzaprine]      Altered mental status    Minocycline Anaphylaxis    Toradol [Ketorolac Tromethamine]      Passes out    Antihistamines, Chlorpheniramine-Type Other (See Comments)     Pt states she passes out    Benadryl [Diphenhydramine] Other (See Comments)     Black out    Carbamazepine     Dextromethorphan     Divalproex Sodium Other (See Comments)    Fosphenytoin     Hydroxyzine Hcl     Lamotrigine     Levetiracetam Hives    Morphine And Related Other (See Comments)     Real ill aggitation  Other reaction(s): Confusion  Combative, agitation  Real ill Aggitation  Combative, agitation      Other      All seizure meds, except gabapentin. Different reactions with each med  All seizure meds, except gabapentin. Different reactions with each med      Oxcarbazepine Other (See Comments)    Oxycodone-Acetaminophen Other (See Comments)     Aggressiveness     Okay with vicodin dilaudid demerol  Aggressiveness     Okay with vicodin dilaudid demerol  Agressiveness; Okay with Vicodin, Dilaudid, Demerol  Does not like      Reglan [Metoclopramide]      Passes out    Topiramate Other (See Comments)    Metronidazole Rash     Seizures. 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 ...temporal lobe epilepsy    Thalassemia minor     Visceral hypersensitivity syndrome        Past Surgical History: Procedure Laterality Date    CHOLECYSTECTOMY      CYST REMOVAL      HERNIA REPAIR      right inguinal    LITHOTRIPSY      OTHER SURGICAL HISTORY      titanum plate upper jaw d/t fracture    TONSILLECTOMY      UPPER GASTROINTESTINAL ENDOSCOPY  11/05/2014    biopsies       Social History     Socioeconomic History    Marital status:      Spouse name: Not on file    Number of children: Not on file    Years of education: Not on file    Highest education level: Not on file   Occupational History    Not on file   Tobacco Use    Smoking status: Current Every Day Smoker     Packs/day: 0.50     Years: 15.00     Pack years: 7.50     Types: Cigarettes    Smokeless tobacco: Never Used   Vaping Use    Vaping Use: Never used   Substance and Sexual Activity    Alcohol use: No    Drug use: No    Sexual activity: Yes   Other Topics Concern    Not on file   Social History Narrative    Not on file     Social Determinants of Health     Financial Resource Strain:     Difficulty of Paying Living Expenses: Not on file   Food Insecurity:     Worried About Running Out of Food in the Last Year: Not on file    Alyssia of Food in the Last Year: Not on file   Transportation Needs:     Lack of Transportation (Medical): Not on file    Lack of Transportation (Non-Medical):  Not on file   Physical Activity:     Days of Exercise per Week: Not on file    Minutes of Exercise per Session: Not on file   Stress:     Feeling of Stress : Not on file   Social Connections:     Frequency of Communication with Friends and Family: Not on file    Frequency of Social Gatherings with Friends and Family: Not on file    Attends Zoroastrian Services: Not on file    Active Member of Clubs or Organizations: Not on file    Attends Club or Organization Meetings: Not on file    Marital Status: Not on file   Intimate Partner Violence:     Fear of Current or Ex-Partner: Not on file    Emotionally Abused: Not on file    Physically Abused: Not on file    Sexually Abused: Not on file   Housing Stability:     Unable to Pay for Housing in the Last Year: Not on file    Number of Places Lived in the Last Year: Not on file    Unstable Housing in the Last Year: Not on file        No family history on file. Vitals:    03/24/22 0957   BP: 102/68   Pulse: 87   SpO2: 98%   Weight: 135 lb (61.2 kg)   Height: 5' 6\" (1.676 m)     Estimated body mass index is 21.79 kg/m² as calculated from the following:    Height as of this encounter: 5' 6\" (1.676 m). Weight as of this encounter: 135 lb (61.2 kg). Physical Exam  Vitals reviewed. Constitutional:       Appearance: She is well-developed. HENT:      Right Ear: Tympanic membrane, ear canal and external ear normal.      Left Ear: Tympanic membrane, ear canal and external ear normal.      Nose: Nose normal.      Mouth/Throat:      Mouth: Mucous membranes are moist.      Pharynx: Oropharynx is clear. Eyes:      Extraocular Movements: Extraocular movements intact. Conjunctiva/sclera: Conjunctivae normal.      Pupils: Pupils are equal, round, and reactive to light. Neck:      Thyroid: No thyromegaly. Cardiovascular:      Rate and Rhythm: Normal rate and regular rhythm. Pulses: Normal pulses. Heart sounds: Normal heart sounds. No murmur heard. Pulmonary:      Effort: Pulmonary effort is normal.      Breath sounds: Normal breath sounds. Abdominal:      General: Bowel sounds are normal.      Palpations: Abdomen is soft. Tenderness: There is no abdominal tenderness. There is no right CVA tenderness or left CVA tenderness. Musculoskeletal:         General: Normal range of motion. Cervical back: Normal range of motion and neck supple. Skin:     General: Skin is warm and dry. Capillary Refill: Capillary refill takes less than 2 seconds. Neurological:      General: No focal deficit present.       Mental Status: She is alert and oriented to person, place, and time.   Psychiatric:         Mood and Affect: Mood is anxious. Behavior: Behavior normal.         Judgment: Judgment normal.         ASSESSMENT/PLAN:  1. Hx of seizure disorder  -     Adam Toscano MD, Neurology, [facility]  2. Flank pain  -     POCT Urinalysis no Micro  3. Polyarthralgia  -     JULIANNA; Future  -     C-Reactive Protein; Future  -     Rheumatoid Factor; Future  4. Coccyxdynia  -     JULIANNA; Future  -     C-Reactive Protein; Future  -     Rheumatoid Factor; Future  -     [address], [facility], Pain Management, [place]  5. Need for hepatitis C screening test  -     Hepatitis C Antibody; Future  6. Encounter for screening for other metabolic disorders  -     CBC with Auto Differential; Future  -     Comprehensive Metabolic Panel; Future  7. Screening for diabetes mellitus  -     Comprehensive Metabolic Panel; Future  8. Screening for thyroid disorder  -     TSH with Reflex; Future  9. Screening for lipid disorders  -     Lipid Panel; Future        I have spent a total 60 minutes face-to-face with this patient and/or guardian. Over 50% of this time was spent on counseling and care coordination. Return in about 4 weeks (around 4/21/2022) for Annual Physical, Lab Work.

## 2022-04-03 ASSESSMENT — ENCOUNTER SYMPTOMS: BACK PAIN: 1

## 2022-04-06 ENCOUNTER — OFFICE VISIT (OUTPATIENT)
Dept: PRIMARY CARE CLINIC | Age: 37
End: 2022-04-06
Payer: MEDICAID

## 2022-04-06 VITALS
WEIGHT: 134.8 LBS | DIASTOLIC BLOOD PRESSURE: 80 MMHG | BODY MASS INDEX: 21.66 KG/M2 | SYSTOLIC BLOOD PRESSURE: 120 MMHG | HEIGHT: 66 IN

## 2022-04-06 DIAGNOSIS — Z13.228 ENCOUNTER FOR SCREENING FOR OTHER METABOLIC DISORDERS: ICD-10-CM

## 2022-04-06 DIAGNOSIS — M53.3 COCCYXDYNIA: ICD-10-CM

## 2022-04-06 DIAGNOSIS — Z11.4 SCREENING FOR HIV (HUMAN IMMUNODEFICIENCY VIRUS): ICD-10-CM

## 2022-04-06 DIAGNOSIS — M25.50 POLYARTHRALGIA: ICD-10-CM

## 2022-04-06 DIAGNOSIS — Z11.59 NEED FOR HEPATITIS C SCREENING TEST: ICD-10-CM

## 2022-04-06 DIAGNOSIS — Z00.00 WELL ADULT EXAM: Primary | ICD-10-CM

## 2022-04-06 DIAGNOSIS — Z13.220 SCREENING FOR LIPID DISORDERS: ICD-10-CM

## 2022-04-06 DIAGNOSIS — Z13.1 SCREENING FOR DIABETES MELLITUS: ICD-10-CM

## 2022-04-06 DIAGNOSIS — K59.89 VISCERAL HYPERSENSITIVITY SYNDROME: ICD-10-CM

## 2022-04-06 DIAGNOSIS — Z13.29 SCREENING FOR THYROID DISORDER: ICD-10-CM

## 2022-04-06 LAB
A/G RATIO: 2.2 (ref 1.1–2.2)
ALBUMIN SERPL-MCNC: 4.8 G/DL (ref 3.4–5)
ALP BLD-CCNC: 55 U/L (ref 40–129)
ALT SERPL-CCNC: 8 U/L (ref 10–40)
ANION GAP SERPL CALCULATED.3IONS-SCNC: 11 MMOL/L (ref 3–16)
AST SERPL-CCNC: 15 U/L (ref 15–37)
BASOPHILS ABSOLUTE: 0 K/UL (ref 0–0.2)
BASOPHILS RELATIVE PERCENT: 0.8 %
BILIRUB SERPL-MCNC: 0.4 MG/DL (ref 0–1)
BUN BLDV-MCNC: 9 MG/DL (ref 7–20)
C-REACTIVE PROTEIN: <3 MG/L (ref 0–5.1)
CALCIUM SERPL-MCNC: 9.5 MG/DL (ref 8.3–10.6)
CHLORIDE BLD-SCNC: 99 MMOL/L (ref 99–110)
CHOLESTEROL, TOTAL: 180 MG/DL (ref 0–199)
CO2: 23 MMOL/L (ref 21–32)
CREAT SERPL-MCNC: 0.6 MG/DL (ref 0.6–1.1)
EOSINOPHILS ABSOLUTE: 0.2 K/UL (ref 0–0.6)
EOSINOPHILS RELATIVE PERCENT: 3.8 %
GFR AFRICAN AMERICAN: >60
GFR NON-AFRICAN AMERICAN: >60
GLUCOSE BLD-MCNC: 97 MG/DL (ref 70–99)
HCT VFR BLD CALC: 38.8 % (ref 36–48)
HDLC SERPL-MCNC: 55 MG/DL (ref 40–60)
HEMOGLOBIN: 12.4 G/DL (ref 12–16)
HEPATITIS C ANTIBODY INTERPRETATION: NORMAL
LDL CHOLESTEROL CALCULATED: 107 MG/DL
LYMPHOCYTES ABSOLUTE: 1.7 K/UL (ref 1–5.1)
LYMPHOCYTES RELATIVE PERCENT: 28.4 %
MCH RBC QN AUTO: 22.9 PG (ref 26–34)
MCHC RBC AUTO-ENTMCNC: 31.9 G/DL (ref 31–36)
MCV RBC AUTO: 71.7 FL (ref 80–100)
MONOCYTES ABSOLUTE: 0.6 K/UL (ref 0–1.3)
MONOCYTES RELATIVE PERCENT: 10.5 %
NEUTROPHILS ABSOLUTE: 3.3 K/UL (ref 1.7–7.7)
NEUTROPHILS RELATIVE PERCENT: 56.5 %
PDW BLD-RTO: 18.2 % (ref 12.4–15.4)
PLATELET # BLD: 257 K/UL (ref 135–450)
PMV BLD AUTO: 9 FL (ref 5–10.5)
POTASSIUM SERPL-SCNC: 4.3 MMOL/L (ref 3.5–5.1)
RBC # BLD: 5.42 M/UL (ref 4–5.2)
RHEUMATOID FACTOR: <10 IU/ML
SEDIMENTATION RATE, ERYTHROCYTE: 6 MM/HR (ref 0–20)
SODIUM BLD-SCNC: 133 MMOL/L (ref 136–145)
TOTAL PROTEIN: 7 G/DL (ref 6.4–8.2)
TRIGL SERPL-MCNC: 89 MG/DL (ref 0–150)
TSH REFLEX: 1.35 UIU/ML (ref 0.27–4.2)
VLDLC SERPL CALC-MCNC: 18 MG/DL
WBC # BLD: 5.8 K/UL (ref 4–11)

## 2022-04-06 PROCEDURE — 99395 PREV VISIT EST AGE 18-39: CPT | Performed by: NURSE PRACTITIONER

## 2022-04-06 PROCEDURE — 99213 OFFICE O/P EST LOW 20 MIN: CPT | Performed by: NURSE PRACTITIONER

## 2022-04-06 PROCEDURE — 36415 COLL VENOUS BLD VENIPUNCTURE: CPT | Performed by: NURSE PRACTITIONER

## 2022-04-06 PROCEDURE — G8427 DOCREV CUR MEDS BY ELIG CLIN: HCPCS | Performed by: NURSE PRACTITIONER

## 2022-04-06 PROCEDURE — G8420 CALC BMI NORM PARAMETERS: HCPCS | Performed by: NURSE PRACTITIONER

## 2022-04-06 PROCEDURE — 4004F PT TOBACCO SCREEN RCVD TLK: CPT | Performed by: NURSE PRACTITIONER

## 2022-04-06 RX ORDER — ONDANSETRON 8 MG/1
8 TABLET, ORALLY DISINTEGRATING ORAL EVERY 8 HOURS PRN
Qty: 90 TABLET | Refills: 2 | Status: SHIPPED | OUTPATIENT
Start: 2022-04-06

## 2022-04-06 ASSESSMENT — ENCOUNTER SYMPTOMS
DIARRHEA: 0
SINUS PAIN: 0
BLOOD IN STOOL: 0
VOMITING: 0
CONSTIPATION: 0
FACIAL SWELLING: 0
NAUSEA: 0
COUGH: 0
SORE THROAT: 0
ABDOMINAL PAIN: 0
SHORTNESS OF BREATH: 0
TROUBLE SWALLOWING: 0
WHEEZING: 0
CHEST TIGHTNESS: 0
EYE PAIN: 0

## 2022-04-06 NOTE — PATIENT INSTRUCTIONS
Patient Education        Well Visit, Ages 25 to 48: Care Instructions  Overview     Well visits can help you stay healthy. Your doctor has checked your overall health and may have suggested ways to take good care of yourself. Your doctor also may have recommended tests. At home, you can help prevent illness withhealthy eating, regular exercise, and other steps. Follow-up care is a key part of your treatment and safety. Be sure to make and go to all appointments, and call your doctor if you are having problems. It's also a good idea to know your test results and keep alist of the medicines you take. How can you care for yourself at home?  Get screening tests that you and your doctor decide on. Screening helps find diseases before any symptoms appear.  Eat healthy foods. Choose fruits, vegetables, whole grains, protein, and low-fat dairy foods. Limit fat, especially saturated fat. Reduce salt in your diet.  Limit alcohol. If you are a man, have no more than 2 drinks a day or 14 drinks a week. If you are a woman, have no more than 1 drink a day or 7 drinks a week.  Get at least 30 minutes of physical activity on most days of the week. Walking is a good choice. You also may want to do other activities, such as running, swimming, cycling, or playing tennis or team sports. Discuss any changes in your exercise program with your doctor.  Reach and stay at a healthy weight. This will lower your risk for many problems, such as obesity, diabetes, heart disease, and high blood pressure.  Do not smoke or allow others to smoke around you. If you need help quitting, talk to your doctor about stop-smoking programs and medicines. These can increase your chances of quitting for good.  Care for your mental health. It is easy to get weighed down by worry and stress. Learn strategies to manage stress, like deep breathing and mindfulness, and stay connected with your family and community.  If you find you often feel sad If you have questions about a medical condition or this instruction, always ask your healthcare professional. [address] any warranty or liability for your use of this information. Patient Education        Coccyx Pain: Care Instructions  Your Care Instructions     The coccyx is your tailbone. You can have pain in your tailbone from a fall or other injury. Pregnancy and childbirth also can cause tailbone pain. Sometimes, the cause of pain is not known. A tailbone injury causes pain when you sit, especially when you slump or sit on a hard seat. Straining to have a bowelmovement also can be very painful. Tailbone injuries can take several months to heal, but in some cases the pain goes even longer. You can take steps at home to ease the pain. In some cases, a doctor injects a corticosteroid medicine into the coccyx to reduce swelling andpain. Follow-up care is a key part of your treatment and safety. Be sure to make and go to all appointments, and call your doctor if you are having problems. It's also a good idea to know your test results and keep alist of the medicines you take. How can you care for yourself at home?  Take pain medicines exactly as directed. ? If the doctor gave you a prescription medicine for pain, take it as prescribed. ? If you are not taking a prescription pain medicine, take an over-the-counter medicine to reduce pain.  Put ice or a cold pack on your tailbone for 10 to 20 minutes at a time. Try to do this every 1 to 2 hours for the next 3 days (when you are awake) or until the swelling goes down. Put a thin cloth between the ice and your skin.  About 2 or 3 days after your injury, you can alternate ice and heat. To soothe the tailbone area, take a warm bath for 20 minutes, 3 or 4 times a day.  Sit on soft, padded surfaces. A doughnut-shaped pillow can take pressure off the tailbone.    Avoid constipation, because straining to have a bowel movement will increase your tailbone pain. ? Include fruits, vegetables, beans, and whole grains in your diet each day. These foods are high in fiber. ? Drink plenty of fluids. If you have kidney, heart, or liver disease and have to limit fluids, talk with your doctor before you increase the amount of fluids you drink. ? Get some exercise every day. Build up slowly to 30 to 60 minutes a day on 5 or more days of the week. ? Take a fiber supplement, such as Citrucel or Metamucil, every day if needed. Read and follow all instructions on the label. ? Schedule time each day for a bowel movement. A daily routine may help. Take your time and do not strain when having a bowel movement.  Follow your doctor's directions for stretching and other exercises that might help with pain. When should you call for help? Call 911 anytime you think you may need emergency care. For example, call if:     You are unable to move a leg at all. Call your doctor now or seek immediate medical care if:     You have new or worse symptoms in your legs or buttocks. Symptoms may include:  ? Numbness or tingling. ? Weakness. ? Pain.      You lose bladder or bowel control. Watch closely for changes in your health, and be sure to contact your doctor if:     You are not getting better as expected. Where can you learn more? Go to https://Favbuy.DataCrowd. org and sign in to your [employer] account. Enter N745 in the [facility] box to learn more about \"Coccyx Pain: Care Instructions. \"     If you do not have an account, please click on the \"Sign Up Now\" link. Current as of: July 1, 2021               Content Version: 13.2  © 0263-9464 Healthwise, [employer]. Care instructions adapted under license by [facility] [employer] [facility] ([facility]). If you have questions about a medical condition or this instruction, always ask your healthcare professional. [address] any warranty or liability for your use of this information. Patient Education        Nausea and Vomiting: Care Instructions  Overview     When you are nauseated, you may feel weak and sweaty and notice a lot of saliva in your mouth. Nausea often leads to vomiting. Most of the time you do not needto worry about nausea and vomiting, but they can be signs of other illnesses. Two common causes of nausea and vomiting are a stomach infection and food poisoning. Nausea and vomiting from a viral stomach infection will usually start to improve within 24 hours. Nausea and vomiting from food poisoning maylast from 12 to 48 hours. The doctor has checked you carefully, but problems can develop later. If you notice any problems or new symptoms, get medical treatment right away. Follow-up care is a key part of your treatment and safety. Be sure to make and go to all appointments, and call your doctor if you are having problems. It's also a good idea to know your test results and keep alist of the medicines you take. How can you care for yourself at home?  To prevent dehydration, drink plenty of fluids. Choose water and other clear liquids until you feel better. If you have kidney, heart, or liver disease and have to limit fluids, talk with your doctor before you increase the amount of fluids you drink.  Rest in bed until you feel better.  When you are able to eat, try clear soups, mild foods, and liquids until all symptoms are gone for 12 to 48 hours. Other good choices include dry toast, crackers, cooked cereal, and gelatin dessert, such as Jell-O. When should you call for help? Call 911 anytime you think you may need emergency care. For example, call if:     You passed out (lost consciousness). Call your doctor now or seek immediate medical care if:     You have symptoms of dehydration, such as:  ? Dry eyes and a dry mouth. ? Passing only a little urine. ?  Feeling thirstier than usual.      You have new or worsening belly pain.      You have a new or higher fever.      You vomit blood or what looks like coffee grounds. Watch closely for changes in your health, and be sure to contact your doctor if:     You have ongoing nausea and vomiting.      Your vomiting is getting worse.      Your vomiting lasts longer than 2 days.      You are not getting better as expected. Where can you learn more? Go to https://chpepiceweb.PasswordBox. org and sign in to your [facility] account. Enter 25 [id number] in the [employer] box to learn more about \"Nausea and Vomiting: Care Instructions. \"     If you do not have an account, please click on the \"Sign Up Now\" link. Current as of: July 1, 2021               Content Version: 13.2  © 2006-2022 Healthwise, Incorporated. Care instructions adapted under license by [facility] ([facility]). If you have questions about a medical condition or this instruction, always ask your healthcare professional. [address] any warranty or liability for your use of this information.

## 2022-04-06 NOTE — PROGRESS NOTES
2022    Dc Winters (:  1985) glenis 43 y.o. female, here for evaluation of the following medical concerns:    HPI    Well Adult Physical   Patient here for a comprehensive physical exam.The patient reports problems - Chronic low back pain and general arthralgias. Patient reports pain is generally 4/10. She states that this had stemmed from old back injury . She has not followed up spine specialist or chronic pain management. She had been referred to rheum, but had not made appt. Denies hx pf fibromyalgia. She had been taking gabapentin, tramadol,valium, and cymbalta. She was told by previous PCP that he expected her to establish with PM for chronic pain management. She has not had lumbar x-rays in some years. 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 She has not followed up with GI. Do you take any herbs or supplements that were not prescribed by a doctor? no Are you taking calcium supplements? no Are you taking aspirin daily? no        Review of Systems   Constitutional: Negative for chills and fever. HENT: Negative for congestion, ear pain, facial swelling, sinus pain, sore throat and trouble swallowing. Eyes: Negative for pain and visual disturbance. Respiratory: Negative for cough, chest tightness, shortness of breath and wheezing. Cardiovascular: Negative for chest pain, palpitations and leg swelling. Gastrointestinal: Negative for abdominal pain, blood in stool, constipation, diarrhea, nausea and vomiting. Endocrine: Negative for polydipsia and polyuria. Genitourinary: Negative for difficulty urinating and hematuria. Musculoskeletal: Positive for arthralgias and back pain. Negative for myalgias. Skin: Negative for pallor and rash. Allergic/Immunologic: Negative for environmental allergies and food allergies. Neurological: Positive for seizures. Negative for dizziness, syncope, weakness, numbness and headaches.    Hematological: Negative for adenopathy. Does not bruise/bleed easily. Psychiatric/Behavioral: Negative for dysphoric mood and suicidal ideas. The patient is nervous/anxious. Prior to Visit Medications    Medication Sig Taking? Authorizing Provider   ondansetron (ZOFRAN-ODT) 8 MG TBDP disintegrating tablet Place 1 tablet under the tongue every 8 hours as needed for Nausea or Vomiting Yes Corinne Come, APRN - CNP   diazePAM (VALIUM) 5 MG tablet Take 5 mg by mouth every 6 hours as needed. Yes Historical Provider, MD   traMADol (ULTRAM) 50 MG tablet TAKE 1 TABLET BY MOUTH THREE TIMES DAILY Yes Historical Provider, MD   levonorgestrel (MIRENA) IUD 52 mg 1 each by IntraUTERine route once Yes Historical Provider, MD   docusate sodium (COLACE) 100 MG capsule Take 1 capsule by mouth 2 times daily as needed for Constipation (take while on narcotic pain medication) Yes Pavan Stringer MD   amphetamine-dextroamphetamine (ADDERALL XR) 20 MG extended release capsule Take 20 mg by mouth every morning. . Yes Historical Provider, MD   albuterol (PROVENTIL HFA;VENTOLIN HFA) 108 (90 BASE) MCG/ACT inhaler Use 2 puffs 4 times daily for 7 days then as needed for wheezing. Dispense with Spacer and instruct in use. At patient's preference may use 60 dose MDI. Yes TAYA Espino CNP   lacosamide (VIMPAT) 50 MG TABS tablet Take 150 mg by mouth 2 times daily Yes Historical Provider, MD   DULoxetine (CYMBALTA) 60 MG capsule Take 60 mg by mouth daily Yes Historical Provider, MD   gabapentin (NEURONTIN) 600 MG tablet Take 1,200 mg by mouth 2 times daily. Yes Historical Provider, MD   ondansetron (ZOFRAN) 8 MG tablet Take 8 mg by mouth every 8 hours as needed for Nausea.  Yes Historical Provider, MD        Allergies   Allergen Reactions    Flexeril [Cyclobenzaprine]      Altered mental status    Minocycline Anaphylaxis    Toradol [Ketorolac Tromethamine]      Passes out    Antihistamines, Chlorpheniramine-Type Other (See Comments)     Pt states she passes out    Benadryl [Diphenhydramine] Other (See Comments)     Black out    Carbamazepine     Dextromethorphan     Divalproex Sodium Other (See Comments)    Fosphenytoin     Hydroxyzine Hcl     Lamotrigine     Levetiracetam Hives    Morphine And Related Other (See Comments)     Real ill aggitation  Other reaction(s): Confusion  Combative, agitation  Real ill Aggitation  Combative, agitation      Other      All seizure meds, except gabapentin. Different reactions with each med  All seizure meds, except gabapentin. Different reactions with each med      Oxcarbazepine Other (See Comments)    Oxycodone-Acetaminophen Other (See Comments)     Aggressiveness     Okay with vicodin dilaudid demerol  Aggressiveness     Okay with vicodin dilaudid demerol  Agressiveness; Okay with Vicodin, Dilaudid, Demerol  Does not like      Reglan [Metoclopramide]      Passes out    Topiramate Other (See Comments)    Metronidazole Rash     Seizures. 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 ...temporal lobe epilepsy    Thalassemia minor     Visceral hypersensitivity syndrome        Past Surgical History:   Procedure Laterality Date    CHOLECYSTECTOMY      CYST REMOVAL      HERNIA REPAIR      right inguinal    LITHOTRIPSY      OTHER SURGICAL HISTORY      titanum plate upper jaw d/t fracture    TONSILLECTOMY      UPPER GASTROINTESTINAL ENDOSCOPY  11/05/2014    biopsies       Social History     Socioeconomic History    Marital status:      Spouse name: Not on file    Number of children: Not on file    Years of education: Not on file    Highest education level: Not on file   Occupational History    Not on file   Tobacco Use    Smoking status: Current Every Day Smoker     Packs/day: 0.50     Years: 15.00     Pack years: 7.50     Types: Cigarettes    Smokeless tobacco: Never Used   Vaping Use    Vaping Use: Never used   Substance and Sexual Activity    Alcohol use: No    Drug use: No    Sexual activity: Yes   Other Topics Concern    Not on file   Social History Narrative    Not on file     Social Determinants of Health     Financial Resource Strain:     Difficulty of Paying Living Expenses: Not on file   Food Insecurity:     Worried About Running Out of Food in the Last Year: Not on file    Alyssia of Food in the Last Year: Not on file   Transportation Needs:     Lack of Transportation (Medical): Not on file    Lack of Transportation (Non-Medical): Not on file   Physical Activity:     Days of Exercise per Week: Not on file    Minutes of Exercise per Session: Not on file   Stress:     Feeling of Stress : Not on file   Social Connections:     Frequency of Communication with Friends and Family: Not on file    Frequency of Social Gatherings with Friends and Family: Not on file    Attends [employer] Services: Not on file    Active Member of [address] [place] [address] [employer] or Organizations: Not on file    Attends Club or Organization Meetings: Not on file    Marital Status: Not on file   Intimate Partner Violence:     Fear of Current or Ex-Partner: Not on file    Emotionally Abused: Not on file    Physically Abused: Not on file    Sexually Abused: Not on file   Housing Stability:     Unable to Pay for Housing in the Last Year: Not on file    Number of [place] in the Last Year: Not on file    Unstable Housing in the Last Year: Not on file        No family history on file.     Vitals:    04/06/22 0940   BP: 120/80   Weight: 134 lb 12.8 oz (61.1 kg)   Height: 5' 6\" (1.676 m)     Estimated body mass index is 21.76 kg/m² as calculated from the following:    Height as of this encounter: 5' 6\" (1.676 m). Weight as of this encounter: 134 lb 12.8 oz (61.1 kg). Physical Exam  Vitals reviewed. Constitutional:       Appearance: She is well-developed. HENT:      Right Ear: Tympanic membrane, ear canal and external ear normal.      Left Ear: Tympanic membrane, ear canal and external ear normal.      Nose: Nose normal.      Mouth/Throat:      Mouth: Mucous membranes are moist.      Pharynx: Oropharynx is clear. Eyes:      Extraocular Movements: Extraocular movements intact. Conjunctiva/sclera: Conjunctivae normal.      Pupils: Pupils are equal, round, and reactive to light. Neck:      Thyroid: No thyromegaly. Cardiovascular:      Rate and Rhythm: Normal rate and regular rhythm. Pulses: Normal pulses. Heart sounds: Normal heart sounds. No murmur heard. Pulmonary:      Effort: Pulmonary effort is normal.      Breath sounds: Normal breath sounds. Abdominal:      General: Bowel sounds are normal.      Palpations: Abdomen is soft. Tenderness: There is no abdominal tenderness. Musculoskeletal:         General: Normal range of motion. Cervical back: Normal range of motion and neck supple. Lumbar back: Tenderness present. No swelling or spasms. Normal range of motion. Negative right straight leg raise test and negative left straight leg raise test.   Skin:     General: Skin is warm and dry. Capillary Refill: Capillary refill takes less than 2 seconds. Neurological:      General: No focal deficit present. Mental Status: She is alert and oriented to person, place, and time. Psychiatric:         Mood and Affect: Mood normal.         Behavior: Behavior normal.         Judgment: Judgment normal.         ASSESSMENT/PLAN:  1. Well adult exam  All ages:   3. Exercise regularly.  Ideally, we should all be getting 30 minutes of exercise 5 days a week to prevent weight gain, improve heart health, prevent arthritis, boost mood and immunity, and encourage good sleep. Exercise is better than any medication! 2. Eat a balanced diet with at least 5 servings of fruits and vegetables daily. Reduce salt and sodium, fats, and sugars. 3. Wear sunscreen when out in the sun. Reapply every 2-3 hours or after swimming or excessive sweating. 4. Get a yearly flu vaccine and keep your tetanus booster up to date every 10 years. 5. Do not smoke or chew! If you smoke, ask your doctor for help to quit. 6. Alcohol is acceptable in moderation, but do not drink more than one drink daily. 100 Karen Cowan MD, Pain Management, [facility]  -     XR LUMBAR SPINE (2-3 VIEWS); Future  -     XR SACRUM COCCYX (MIN 2 VIEWS); Future  -     Brian Lund MD, Orthopedic Surgery, [facility]  3. Polyarthralgia  -     CBC with Auto Differential  -     Comprehensive Metabolic Panel  -     RHEUMATOID FACTOR  -     Nikole Cuadra MD, Pain Management, [facility]  4. Visceral hypersensitivity syndrome  -     Sedimentation Rate  -     C-Reactive Protein  -     Comprehensive Metabolic Panel  -     ondansetron (ZOFRAN-ODT) 8 MG TBDP disintegrating tablet; Place 1 tablet under the tongue every 8 hours as needed for Nausea or Vomiting, Disp-90 tablet, R-2Normal  5. Screening for HIV (human immunodeficiency virus)  -     HIV Screen  -     Hepatitis C Antibody  6. Encounter for screening for other metabolic disorders  -     Sedimentation Rate  -     C-Reactive Protein  -     JULIANNA  7. Need for hepatitis C screening test  -     Hepatitis C Antibody  8. Screening for thyroid disorder  -     TSH with Reflex  9. Screening for lipid disorders  -     LIPID PANEL  10. Screening for diabetes mellitus  -     Comprehensive Metabolic Panel       Return in about 6 months (around 10/6/2022) for Medication Management.

## 2022-04-07 DIAGNOSIS — R79.89 ABNORMAL CBC: Primary | ICD-10-CM

## 2022-04-07 LAB
ANTI-NUCLEAR ANTIBODY (ANA): NEGATIVE
HIV AG/AB: NORMAL
HIV ANTIGEN: NORMAL
HIV-1 ANTIBODY: NORMAL
HIV-2 AB: NORMAL

## 2022-04-13 ASSESSMENT — ENCOUNTER SYMPTOMS: BACK PAIN: 1

## 2022-05-10 ENCOUNTER — OFFICE VISIT (OUTPATIENT)
Dept: ORTHOPEDIC SURGERY | Age: 37
End: 2022-05-10

## 2022-05-10 VITALS — BODY MASS INDEX: 21.53 KG/M2 | HEIGHT: 66 IN | WEIGHT: 134 LBS

## 2022-05-10 DIAGNOSIS — M53.3 SACRAL PAIN: ICD-10-CM

## 2022-05-10 DIAGNOSIS — M54.50 LUMBAR PAIN: Primary | ICD-10-CM

## 2022-05-10 DIAGNOSIS — M43.16 SPONDYLOLISTHESIS OF LUMBAR REGION: ICD-10-CM

## 2022-05-10 PROCEDURE — G8427 DOCREV CUR MEDS BY ELIG CLIN: HCPCS | Performed by: ORTHOPAEDIC SURGERY

## 2022-05-10 PROCEDURE — G8420 CALC BMI NORM PARAMETERS: HCPCS | Performed by: ORTHOPAEDIC SURGERY

## 2022-05-10 PROCEDURE — 99204 OFFICE O/P NEW MOD 45 MIN: CPT | Performed by: ORTHOPAEDIC SURGERY

## 2022-05-10 PROCEDURE — 4004F PT TOBACCO SCREEN RCVD TLK: CPT | Performed by: ORTHOPAEDIC SURGERY

## 2022-05-10 NOTE — PROGRESS NOTES
New Patient: LUMBAR SPINE    Referring Provider:  Ana Caba APR*    CHIEF COMPLAINT:    Chief Complaint   Patient presents with    Back Pain     lumbar sacrum and coccyx       HISTORY OF PRESENT ILLNESS:    Ms. Altaf Farris  is a pleasant 43 y.o. female who currently referred by TAYA Garnett for the evaluation of low back and left leg pain. She notes her symptoms began after her falls while climbing about 10 years ago. They have increased since that time. Her left leg pain is in an L5 distribution. She rates her back pain 6/10 in her left buttock pain 8/10 and her left posterior leg pain 5/10. She denies numbness and tingling of her left lower extremity, saddle anesthesia and bowel bladder abnormality. She notes weakness of her left leg. 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 ...temporal lobe epilepsy    Thalassemia minor     Visceral hypersensitivity syndrome       Past Surgical History:     Past Surgical History:   Procedure Laterality Date    CHOLECYSTECTOMY      CYST REMOVAL      HERNIA REPAIR      right inguinal    LITHOTRIPSY      OTHER SURGICAL HISTORY      titanum plate upper jaw d/t fracture    TONSILLECTOMY      UPPER GASTROINTESTINAL ENDOSCOPY  11/05/2014    biopsies     Current Medications:     Current Outpatient Medications:     ondansetron (ZOFRAN-ODT) 8 MG TBDP disintegrating tablet, Place 1 tablet under the tongue every 8 hours as needed for Nausea or Vomiting, Disp: 90 tablet, Rfl: 2    diazePAM (VALIUM) 5 MG tablet, Take 5 mg by mouth every 6 hours as needed. , Disp: , Rfl:     traMADol (ULTRAM) 50 MG tablet, TAKE 1 TABLET BY MOUTH THREE TIMES DAILY, Disp: , Rfl:     levonorgestrel (MIRENA) IUD 52 mg, 1 each by IntraUTERine route once, Disp: , Rfl:     docusate sodium (COLACE) 100 MG capsule, Take 1 capsule by mouth 2 times daily as needed for Constipation (take while on narcotic pain medication), Disp: 40 capsule, Rfl: 0    amphetamine-dextroamphetamine (ADDERALL XR) 20 MG extended release capsule, Take 20 mg by mouth every morning. ., Disp: , Rfl:     albuterol (PROVENTIL HFA;VENTOLIN HFA) 108 (90 BASE) MCG/ACT inhaler, Use 2 puffs 4 times daily for 7 days then as needed for wheezing. Dispense with Spacer and instruct in use. At patient's preference may use 60 dose MDI., Disp: 1 Inhaler, Rfl: 0    lacosamide (VIMPAT) 50 MG TABS tablet, Take 150 mg by mouth 2 times daily, Disp: , Rfl:     DULoxetine (CYMBALTA) 60 MG capsule, Take 60 mg by mouth daily, Disp: , Rfl:     gabapentin (NEURONTIN) 600 MG tablet, Take 1,200 mg by mouth 2 times daily. , Disp: , Rfl:     ondansetron (ZOFRAN) 8 MG tablet, Take 8 mg by mouth every 8 hours as needed for Nausea., Disp: , Rfl:   Allergies:  Flexeril [cyclobenzaprine]; Minocycline; Toradol [ketorolac tromethamine]; Antihistamines, chlorpheniramine-type; Benadryl [diphenhydramine]; Carbamazepine; Dextromethorphan; Divalproex sodium; Fosphenytoin; Hydroxyzine hcl; Lamotrigine; Levetiracetam; Morphine and related; Other; Oxcarbazepine; Oxycodone-acetaminophen; Reglan [metoclopramide]; Topiramate; Metronidazole; Sudafed [pseudoephedrine hcl]; and Tramadol  Social History:    reports that she has been smoking cigarettes. She has a 7.50 pack-year smoking history.  She has never used smokeless tobacco. She reports that she does not drink alcohol and does not use drugs. Family History:   History reviewed. No pertinent family history. REVIEW OF SYSTEMS: Full ROS noted & scanned   CONSTITUTIONAL: Denies unexplained weight loss, fevers, chills or fatigue  NEUROLOGICAL: Denies unsteady gait or progressive weakness  MUSCULOSKELETAL: Denies joint swelling or redness  PSYCHOLOGICAL: Denies anxiety, depression   SKIN: Denies skin changes, delayed healing, rash, itching   HEMATOLOGIC: Denies easy bleeding or bruising  ENDOCRINE: Denies excessive thirst, urination, heat/cold  RESPIRATORY: Denies current dyspnea, cough  GI: Denies nausea, vomiting, diarrhea   : Denies bowel or bladder issues      PHYSICAL EXAM:    Vitals: Height 5' 5.98\" (1.676 m), weight 134 lb (60.8 kg). GENERAL EXAM:  · General Apparence: Patient is adequately groomed with no evidence of malnutrition. · Orientation: The patient is oriented to time, place and person. · Mood & Affect:The patient's mood and affect are appropriate. · Vascular: Examination reveals no swelling tenderness in upper or lower extremities. Good capillary refill. · Lymphatic: The lymphatic examination bilaterally reveals all areas to be without enlargement or induration  · Sensation: Sensation is intact without deficit  · Coordination/Balance: Good coordination     LUMBAR/SACRAL EXAMINATION:  · Inspection: Local inspection shows no step-off or bruising. Lumbar alignment is normal.  Sagittal and Coronal balance is neutral.      · Palpation:   No evidence of tenderness at the midline. No tenderness bilaterally at the paraspinal or trochanters. There is no step-off or paraspinal spasm. · Range of Motion: Lumbar flexion, extension and rotation are mildly limited due to pain. · Strength:   Strength testing is 5/5 in all muscle groups tested. · Special Tests:   Straight leg raise and crossed SLR negative. Leg length and pelvis level. · Skin: There are no rashes, ulcerations or lesions.   · Reflexes: Reflexes are symmetrically 2+ at the patellar and ankle tendons. Clonus absent bilaterally at the feet. · Gait & station: normal, patient ambulates without assistance    · Additional Examinations:   · RIGHT LOWER EXTREMITY: Inspection/examination of the right lower extremity does not show any tenderness, deformity or injury. Range of motion is unremarkable. There is no gross instability. There are no rashes, ulcerations or lesions. Strength and tone are normal.    · LEFT LOWER EXTREMITY:  Inspection/examination of the left lower extremity does not show any tenderness, deformity or injury. Range of motion is unremarkable. There is no gross instability. There are no rashes, ulcerations or lesions. Strength and tone are normal.    Diagnostic Testing:    I reviewed AP and lateral x-rays of the lumbar spine that were obtained in the office today. Those show an isthmic grade 1 spondylolisthesis L4-L5    I reviewed AP and lateral x-rays of her sacrum and coccyx that were obtained in the office today. Those are essentially normal    I reviewed CT images of her abdomen from 7/23/2019 in the office today. Those show grade 1 spondylolisthesis L4-L5 with bilateral pars defects    Impression:   Grade 1 isthmic spondylolisthesis L4-L5    Plan:    Discussed treatment options including observation, physical therapy, epidural injection, and additional imaging. She would like to proceed with physical therapy.   He may call to schedule a lumbar MRI if her symptoms persist after that

## 2022-05-18 ENCOUNTER — HOSPITAL ENCOUNTER (OUTPATIENT)
Dept: PHYSICAL THERAPY | Age: 37
Setting detail: THERAPIES SERIES
Discharge: HOME | End: 2022-05-18

## 2022-05-18 NOTE — FLOWSHEET NOTE
[address] [facility]. [place], [place] [address]  Phone: [phone]   Fax:     [phone]    Physical Therapy  Cancellation/No-show Note  Patient Name:  Jamila Claros  :  1985   Date:  2022  Cancelled visits to date: 0  No-shows to date: 1 NP    Patient status for today's appointment patient:  []  Cancelled  []  Rescheduled appointment  [x]  No-show     Reason given by patient:   []  Patient ill  []  Conflicting appointment  []  No transportation    []  Conflict with work  [x]  No reason given  []  Other:     Comments:      Phone call information:   []  Phone call made today to patient at _ time at number provided:      []  Patient answered, conversation as follows:    []  Patient did not answer, message left as follows:  [x]  Phone call not made today.      Electronically signed by:  Yun Zelaya, PT

## 2022-05-27 ENCOUNTER — OFFICE VISIT (OUTPATIENT)
Dept: NEUROLOGY | Age: 37
End: 2022-05-27
Payer: MEDICAID

## 2022-05-27 VITALS
HEART RATE: 63 BPM | DIASTOLIC BLOOD PRESSURE: 84 MMHG | SYSTOLIC BLOOD PRESSURE: 122 MMHG | WEIGHT: 134 LBS | BODY MASS INDEX: 22.33 KG/M2 | HEIGHT: 65 IN

## 2022-05-27 DIAGNOSIS — G40.209 PARTIAL SYMPTOMATIC EPILEPSY WITH COMPLEX PARTIAL SEIZURES, NOT INTRACTABLE, WITHOUT STATUS EPILEPTICUS (HCC): Primary | ICD-10-CM

## 2022-05-27 DIAGNOSIS — F44.5 PSYCHOGENIC NONEPILEPTIC SEIZURE: ICD-10-CM

## 2022-05-27 PROCEDURE — G8420 CALC BMI NORM PARAMETERS: HCPCS | Performed by: PSYCHIATRY & NEUROLOGY

## 2022-05-27 PROCEDURE — G8427 DOCREV CUR MEDS BY ELIG CLIN: HCPCS | Performed by: PSYCHIATRY & NEUROLOGY

## 2022-05-27 PROCEDURE — 4004F PT TOBACCO SCREEN RCVD TLK: CPT | Performed by: PSYCHIATRY & NEUROLOGY

## 2022-05-27 PROCEDURE — 99205 OFFICE O/P NEW HI 60 MIN: CPT | Performed by: PSYCHIATRY & NEUROLOGY

## 2022-05-27 RX ORDER — GABAPENTIN 600 MG/1
TABLET ORAL
Qty: 60 TABLET | Refills: 0 | Status: SHIPPED | OUTPATIENT
Start: 2022-05-27 | End: 2022-06-27

## 2022-05-27 RX ORDER — LACOSAMIDE 50 MG/1
TABLET ORAL
Qty: 60 TABLET | Refills: 0 | Status: SHIPPED | OUTPATIENT
Start: 2022-05-27 | End: 2022-06-27

## 2022-05-27 NOTE — PROGRESS NOTES
NEUROLOGY CONSULTATION     Chief Complaint   Patient presents with    Seizures       HISTORY OF PRESENT ILLNESS :    Noemi Diaz is a 43 y.o. female who is referred by Dr. Norene Claude   History was obtained from patient  Additional history was obtained from her . Patient was diagnosed with epilepsy around age 8. Patient states that she had spells in which show her vision would be altered. She would then have episodes where she would pass out and have loss of bladder and bowel control. Over the years patient has been on several medications and was found to be allergic to several medications. Patient states that she was unable to take Dilantin Depakote Trileptal carbamazepine and other drugs. She then was put on Vimpat and gabapentin and that seemed to help to some extent. Patient also has anxiety and panic attacks. Patient states that she was seen by neurology at [facility]. She later was seen by [facility] neurology. She had some EEGs which were abnormal with left temporal sharp wave activity. MRI brain done at [facility] was normal.  Patient had some video EEG monitoring done at [facility].  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 This has been mentioned in the report by several neurologist including [place] and [facility].     REVIEW OF SYSTEMS    Constitutional:  [x]   Chills   []  Fatigue   []  Fevers   []  Malaise   []  Weight loss     [] Denies all of the above    Eyes:  [x]  Double vision   [x]  Blurry vision     [] Denies all of the above    Ears, nose, mouth, throat, and face:   [] Hearing loss    []   Hoarseness      []  Snoring    [x]  Tinnitus       [] Denies all of the above     Respiratory:   []  Cough    []  Shortness of breath         [x] Denies all of the above     Cardiovascular:   []  Chest pain    []  Exertional chest pressure/discomfort           [] Palpitations []  Syncope     [x] Denies all of the above    Gastrointestinal:   []  Abdominal pain   []  Constipation    [x]  Diarrhea    []   Dysphagia                      [] Denies all of the above    Genitourinary:      []  Frequency   []  Hematuria     []  Urinary incontinence           [x] Denies all of the above     Hematologic/lymphatic:  []  Bleeding    []  Easy bruising   [x]  Anemia  [] Denies all of the above     Musculoskeletal:   [] Back pain       []  Myalgias    []  Neck pain           [] Denies all of the above    Neurological: As noted in HPI    Behavioral/Psych:   [x] Anxiety    [x]  Depression     [x]  Mood swings     [] Denies all of the above     Endocrine:   []  Temperature intolerance     [] Fatigue      [x] Denies all of the above     Allergic/Immunologic:   [] Hay fever    [x] Denies all of the above     Past Medical History:   Diagnosis Date    Anxiety     [facility]immune disease [facility]     Broken jaw ([facility]     titanum plate    Bronchiolitis     H/O renal failure 2013    left kidney    History of kidney stones     IBS (irritable bowel syndrome)     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 ...temporal lobe epilepsy    Thalassemia minor     Visceral hypersensitivity syndrome      No family history on file.   Social History     Socioeconomic History    Marital status:      Spouse name: Not on file    Number of children: Not on file    Years of education: Not on file    Highest education level: Not on file   Occupational History    Not on file   Tobacco Use    Smoking status: Current Every Day Smoker     Packs/day: 0.50     Years: 15.00     Pack years: 7.50     Types: Cigarettes    Smokeless tobacco: Never Used   Vaping Use    Vaping Use: Never used   Substance and Sexual Activity    Alcohol use: No    Drug use: No    Sexual activity: Yes   Other Topics Concern    Not on file   Social History Narrative    Not on file     Social Determinants of Health     Financial Resource Strain:     Difficulty of Paying Living Expenses: Not on file   Food Insecurity:     Worried About Running Out of Food in the Last Year: Not on file    Alyssia of Food in the Last Year: Not on file   Transportation Needs:     Lack of Transportation (Medical): Not on file    Lack of Transportation (Non-Medical): Not on file   Physical Activity:     Days of Exercise per Week: Not on file    Minutes of Exercise per Session: Not on file   Stress:     Feeling of Stress : Not on file   Social Connections:     Frequency of Communication with Friends and Family: Not on file    Frequency of Social Gatherings with Friends and Family: Not on file    Attends Orthodox Services: Not on file    Active Member of [address] [place] [address] [employer] or Organizations: Not on file    Attends Club or Organization Meetings: Not on file    Marital Status: Not on file   Intimate Partner Violence:     Fear of Current or Ex-Partner: Not on file    Emotionally Abused: Not on file    Physically Abused: Not on file    Sexually Abused: Not on file   Housing Stability:     Unable to Pay for Housing in the Last Year: Not on file    Number of [place] in the Last Year: Not on file    Unstable Housing in the Last Year: Not on file       PHYSICAL EXAMINATION:  /84   Pulse 63   Ht 5' 5\" (1.651 m)   Wt 134 lb (60.8 kg)   BMI 22.30 kg/m²   Appearance: Well appearing, well nourished and in no distress  Mental Status Exam: Patient is alert, oriented to person, place and time. Recent and remote memory is normal  Fund of Knowledge is normal  Attention/concentration is normal.   Speech : No dysarthria  Language : No aphasia  Funduscopic Exam: sharp disc margins  Cranial Nerves:   II: Visual fields:  Full to confrontation  III: Pupils:  equal, round, reactive to light  III,IV,VI: Extra Ocular Movements are intact.  No nystagmus  V: Facial sensation ago.  I explained to the patient that tramadol can decrease seizure threshold and increased risk of seizures. Patient however states that she has been able to tolerate it fairly well without problems. RECOMMENDATIONS :  Discussed with patient and her   I will start her back on lower dose of Vimpat and gabapentin and slowly increase the dose over the next several weeks. I will start her on Vimpat 50 mg twice daily as well as gabapentin 600 mg twice daily  I will see her back in a month for follow-up. Patient is aware of the driving restrictions and does not drive an automobile at this time. Thank you for this consultation          Please note a portion of this chart was generated using dragon dictation software. Although every effort was made to ensure the accuracy of this automated transcription, some errors in transcription may have occurred.

## 2023-01-18 RX ORDER — KETAMINE HCL 100 MG
25 TROCHE SUBLINGUAL 3 TIMES DAILY
Status: ON HOLD | COMMUNITY
End: 2023-01-25 | Stop reason: HOSPADM

## 2023-01-18 NOTE — PROGRESS NOTES
Place patient label inside box (if no patient label, complete below)  Name:  :  MR#:     Nikole Every / PROCEDURE  I (we), Gar Devoid  authorize DR LORENA Otero  and/or such assistants as may be selected by him/her, to perform the following operation/procedure(s):L4-5 ANTERIOR LUMBAR INTERBODY FUSION WITH PEDICLE SCREW FIXATION, POSSIBLE FACETECTOMY        Note: If unable to obtain consent prior to an emergent procedure, document the emergent reason in the medical record. This procedure has been explained to my (our) satisfaction and included in the explanation was: The intended benefit, nature, and extent of the procedure to be performed; The significant risks involved and the probability of success; Alternative procedures and methods of treatment; The dangers and probable consequences of such alternatives (including no procedure or treatment); The expected consequences of the procedure on my future health; Whether other qualified individuals would be performing important surgical tasks and/or whether  would be present to advise or support the procedure. I (we) understand that there are other risks of infection and other serious complications in the pre-operative/procedural and postoperative/procedural stages of my (our) care. I (we) have asked all of the questions which I (we) thought were important in deciding whether or not to undergo treatment or diagnosis. These questions have been answered to my (our) satisfaction. I (we) understand that no assurance can be given that the procedure will be a success, and no guarantee or warranty of success has been given to me (us).     It has been explained to me (us) that during the course of the operation/procedure, unforeseen conditions may be revealed that necessitate extension of the original procedure(s) or different procedure(s) than those set forth in Paragraph 1. I (we) authorize and request that the above-named physician, his/her assistants or his/her designees, perform procedures as necessary and desirable if deemed to be in my (our) best interest.     Revised 8/2/2021                                                                          Page 1 of 2           I acknowledge that health care personnel may be observing this procedure for the purpose of medical education or other specified purposes as may be necessary as requested and/or approved by my (our) physician. I (we) consent to the disposal by the hospital Pathologist of the removed tissue, parts or organs in accordance with hospital policy. I do ____ do not ____ consent to the use of a local infiltration pain blocking agent that will be used by my provider/surgical provider to help alleviate pain during my procedure. I do ____ do not ____ consent to an emergent blood transfusion in the case of a life-threatening situation that requires blood components to be administered. This consent is valid for 24 hours from the beginning of the procedure. This patient does ____ or does not ____ currently have a DNR status/order. If DNR order is in place, obtain Addendum to the Surgical Consent for ALL Patients with a DNR Order to address mateusz-operative status for limited intervention or DNR suspension.      I have read and fully understand the above Consent for Operation/Procedure and that all blanks were completed before I signed the consent.   _____________________________       _____________________      ____/____am/pm  Signature of Patient or legal representative      Printed Name / Relationship            Date / Time   ____________________________       _____________________      ____/____am/pm  Witness to Signature                                    Printed Name                    Date / Time    If patient is unable to sign or is a minor, complete the following)  Patient is a minor, ____ years of age, or unable to sign because:   ______________________________________________________________________________________________    If a phone consent is obtained, consent will be documented by using two health care professionals, each affirming that the consenting party has no questions and gives consent for the procedure discussed with the physician/provider.   _____________________          ____________________       _____/_____am/pm   2nd witness to phone consent        Printed name           Date / Time    Informed Consent:  I have provided the explanation described above in section 1 to the patient and/or legal representative.  I have provided the patient and/or legal representative with an opportunity to ask any questions about the proposed operation/procedure.   ___________________________          ____________________         ____/____am/pm  Provider / Proceduralist                            Printed name            Date / Time  Revised 8/2/2021                                                                      Page 2 of 2

## 2023-01-18 NOTE — PROGRESS NOTES
[facility] PRE-SURGICAL TESTING INSTRUCTIONS                      PRIOR TO PROCEDURE DATE:    1. PLEASE FOLLOW ANY INSTRUCTIONS GIVEN TO YOU PER YOUR SURGEON. 2. Arrange for someone to drive you home and be with you for the first 24 hours after discharge for your safety after your procedure for which you received sedation. Ensure it is someone we can share information with regarding your discharge. NOTE: At this time ONLY 2 ADULTS may accompany you   One person ENCOURAGED to stay at hospital entire time if outpatient surgery      3. You must contact your surgeon for instructions IF:  You are taking any blood thinners, aspirin, anti-inflammatory or vitamins. There is a change in your physical condition such as a cold, fever, rash, cuts, sores, or any other infection, especially near your surgical site. 4. Do not drink alcohol the day before or day of your procedure. Do not use any recreational marijuana at least 24 hours or street drugs (heroin, cocaine) at minimum 5 days prior to your procedure. 5. A Pre-Surgical History and Physical MUST be completed WITHIN 30 DAYS OR LESS prior to your procedure. by your Physician or an Urgent Care        THE DAY OF YOUR PROCEDURE:  1. Follow instructions for ARRIVAL TIME as DIRECTED BY YOUR SURGEON. 2. Enter the MAIN entrance from [address] and follow the signs to the free Parking [employer] or [employer] (offered free of charge 7 am-5pm). 3. Enter the Main Entrance of the hospital (do not enter from the lower level of the parking garage). Upon entrance, check in with the  at the surgical information desk on your LEFT. Bring your insurance card and photo ID to register      4. DO NOT EAT ANYTHING 8 hours prior to arrival for surgery. You may have up to 8 ounces of water 4 hours prior to your arrival for surgery.    NOTE: ALL Gastric, Bariatric & Bowel surgery patients - you MUST follow your surgeon's instructions regarding eating/ drinking as you will have very specific instructions to follow. If you did not receive these, call your surgeon's office immediately. 5. MEDICATIONS:  Take the following medications with a SMALL sip of water: GABAPENTIN, VIMPAT, CYMBALTA  Use your usual dose of inhalers the morning of surgery. BRING your rescue inhaler with you to hospital.   Anesthesia does NOT want you to take insulin the morning of surgery. They will control your blood sugar while you are at the hospital. Please contact your ordering physician for instructions regarding your insulin the night before your procedure. If you have an insulin pump, please keep it set on basal rate. Bariatric patient's call your surgeon if on diabetic medications as some may need to be stopped 1 week prior to surgery    6. Do not swallow additional water when brushing teeth. No gum, candy, mints, or ice chips. Refrain from smoking or at least decrease the amount on day of surgery. 7. Morning of surgery:   Take a shower with an antibacterial soap (i.e., Safeguard or Dial) OR your physician may have instructed you to use Hibiclens. Dress in loose, comfortable clothing appropriate for redressing after your procedure. Do not wear jewelry (including body piercings), make-up (especially NO eye make-up), fingernail polish (NO toenail polish if foot/leg surgery), lotion, powders, or metal hairclips. Do not shave or wax for 72 hours prior to procedure near your operative site. Shaving with a razor can irritate your skin and make it easier to develop an infection. On the day of your procedure, any hair that needs to be removed near the surgical site will be 'clipped' by a healthcare worker using a special clipper designed to avoid skin irritation. 8. Dentures, glasses, or contacts will need to be removed before your procedure. Bring cases for your glasses, contacts, dentures, or hearing aids to protect them while you are in surgery.       9. If you use a CPAP, please bring it with you on the day of your procedure. 10. We recommend that valuable personal belongings such as cash, cell phones, e-tablets, or jewelry, be left at home during your stay. The hospital will not be responsible for valuables that are not secured in the hospital safe. However, if your insurance requires a co-pay, you may want to bring a method of payment, i.e., Check or credit card, if you wish to pay your co-pay the day of surgery. 11. If you are to stay overnight, you may bring a bag with personal items. Please have any large items you may need brought in by your family after your arrival to your hospital room. 12. If you have a Living Will or Durable Power of , please bring a copy on the day of your procedure. How we keep you safe and work to prevent surgical site infections:   1. Health care workers should always check your ID bracelet to verify your name and birth date. You will be asked many times to state your name, date of birth, and allergies. 2. Health care workers should always clean their hands with soap or alcohol gel before providing care to you. It is okay to ask anyone if they cleaned their hands before they touch you. 3. You will be actively involved in verifying the type of procedure you are having and ensuring the correct surgical site. This will be confirmed multiple times prior to your procedure. Do NOT martínez your surgery site UNLESS instructed to by your surgeon. 4. When you are in the operating room, your surgical site will be cleansed with a special soap, and in most cases, you will be given an antibiotic before the surgery begins. What to expect AFTER your procedure? 1. Immediately following your procedure, your will be taken to the PACU for the first phase of your recovery. Your nurse will help you recover from any potential side effects of anesthesia, such as extreme drowsiness, changes in your vital signs or breathing patterns. Nausea, headache, muscle aches, or sore throat may also occur after anesthesia. Your nurse will help you manage these potential side effects. 2. For comfort and safety, arrange to have someone at home with you for the first 24 hours after discharge. 3. You and your family will be given written instructions about your diet, activity, dressing care, medications, and return visits. 4. Once at home, should issues with nausea, pain, or bleeding occur, or should you notice any signs of infection, you should call your surgeon. 5. Always clean your hands before and after caring for your wound. Do not let your family touch your surgery site without cleaning their hands. 6. Narcotic pain medications can cause significant constipation. You may want to add a stool softener to your postoperative medication schedule or speak to your surgeon on how best to manage this SIDE EFFECT. SPECIAL INSTRUCTIONS       Thank you for allowing us to care for you. We strive to exceed your expectations in the delivery of care and service provided to you and your family. If you need to contact the [place] [address] staff for any reason, please call us at [phone]    Instructions reviewed with patient during preadmission testing phone interview.   Deborah Stringer RN.1/18/2023 .3:30 PM      ADDITIONAL EDUCATIONAL INFORMATION REVIEWED PER PHONE WITH YOU AND/OR YOUR FAMILY:  Yes Hibiclens® Bathing Instructions   Yes Antibacterial Soap

## 2023-01-18 NOTE — PROGRESS NOTES
CALL TO DR ALLEN [facility] OFFICE FOR H&P. PATIENT HAS ONLY COPY. SHE WILL BRING DOS. OFFICE WILL FAX EKG AND LABS.   HS

## 2023-01-20 ENCOUNTER — ANESTHESIA EVENT (OUTPATIENT)
Dept: OPERATING ROOM | Age: 38
End: 2023-01-20
Payer: MEDICARE

## 2023-01-23 ENCOUNTER — APPOINTMENT (OUTPATIENT)
Dept: CT IMAGING | Age: 38
End: 2023-01-23
Attending: NEUROLOGICAL SURGERY
Payer: MEDICARE

## 2023-01-23 ENCOUNTER — ANESTHESIA (OUTPATIENT)
Dept: OPERATING ROOM | Age: 38
End: 2023-01-23
Payer: MEDICARE

## 2023-01-23 ENCOUNTER — APPOINTMENT (OUTPATIENT)
Dept: GENERAL RADIOLOGY | Age: 38
End: 2023-01-23
Attending: NEUROLOGICAL SURGERY
Payer: MEDICARE

## 2023-01-23 ENCOUNTER — HOSPITAL ENCOUNTER (INPATIENT)
Age: 38
LOS: 3 days | Discharge: HOME | End: 2023-01-26
Attending: NEUROLOGICAL SURGERY | Admitting: NEUROLOGICAL SURGERY
Payer: MEDICARE

## 2023-01-23 DIAGNOSIS — Z98.1 S/P LUMBAR FUSION: Primary | ICD-10-CM

## 2023-01-23 PROBLEM — M48.062 LUMBAR STENOSIS WITH NEUROGENIC CLAUDICATION: Status: ACTIVE | Noted: 2023-01-23

## 2023-01-23 LAB
ABO/RH: NORMAL
ANTIBODY SCREEN: NORMAL
APTT: 25.5 SEC (ref 23–34.3)
PREGNANCY, URINE: NEGATIVE

## 2023-01-23 PROCEDURE — 6360000002 HC RX W HCPCS

## 2023-01-23 PROCEDURE — 7100000000 HC PACU RECOVERY - FIRST 15 MIN: Performed by: NEUROLOGICAL SURGERY

## 2023-01-23 PROCEDURE — 6360000002 HC RX W HCPCS: Performed by: NURSE PRACTITIONER

## 2023-01-23 PROCEDURE — 86900 BLOOD TYPING SEROLOGIC ABO: CPT

## 2023-01-23 PROCEDURE — 6360000002 HC RX W HCPCS: Performed by: NEUROLOGICAL SURGERY

## 2023-01-23 PROCEDURE — C9290 INJ, BUPIVACAINE LIPOSOME: HCPCS | Performed by: NEUROLOGICAL SURGERY

## 2023-01-23 PROCEDURE — C1821 INTERSPINOUS IMPLANT: HCPCS | Performed by: NEUROLOGICAL SURGERY

## 2023-01-23 PROCEDURE — 3700000000 HC ANESTHESIA ATTENDED CARE: Performed by: NEUROLOGICAL SURGERY

## 2023-01-23 PROCEDURE — 2580000003 HC RX 258: Performed by: NEUROLOGICAL SURGERY

## 2023-01-23 PROCEDURE — 0SB20ZZ EXCISION OF LUMBAR VERTEBRAL DISC, OPEN APPROACH: ICD-10-PCS | Performed by: NEUROLOGICAL SURGERY

## 2023-01-23 PROCEDURE — 2580000003 HC RX 258: Performed by: NURSE PRACTITIONER

## 2023-01-23 PROCEDURE — 6360000002 HC RX W HCPCS: Performed by: NURSE ANESTHETIST, CERTIFIED REGISTERED

## 2023-01-23 PROCEDURE — APPNB45 APP NON BILLABLE 31-45 MINUTES

## 2023-01-23 PROCEDURE — 8E0WXBZ COMPUTER ASSISTED PROCEDURE OF TRUNK REGION: ICD-10-PCS | Performed by: NEUROLOGICAL SURGERY

## 2023-01-23 PROCEDURE — 77011 CT SCAN FOR LOCALIZATION: CPT

## 2023-01-23 PROCEDURE — 0SG00K1 FUSION OF LUMBAR VERTEBRAL JOINT WITH NONAUTOLOGOUS TISSUE SUBSTITUTE, POSTERIOR APPROACH, POSTERIOR COLUMN, OPEN APPROACH: ICD-10-PCS | Performed by: NEUROLOGICAL SURGERY

## 2023-01-23 PROCEDURE — 0SG00A0 FUSION OF LUMBAR VERTEBRAL JOINT WITH INTERBODY FUSION DEVICE, ANTERIOR APPROACH, ANTERIOR COLUMN, OPEN APPROACH: ICD-10-PCS | Performed by: NEUROLOGICAL SURGERY

## 2023-01-23 PROCEDURE — 7100000001 HC PACU RECOVERY - ADDTL 15 MIN: Performed by: NEUROLOGICAL SURGERY

## 2023-01-23 PROCEDURE — 84703 CHORIONIC GONADOTROPIN ASSAY: CPT

## 2023-01-23 PROCEDURE — 86901 BLOOD TYPING SEROLOGIC RH(D): CPT

## 2023-01-23 PROCEDURE — 2580000003 HC RX 258: Performed by: NURSE ANESTHETIST, CERTIFIED REGISTERED

## 2023-01-23 PROCEDURE — 6360000002 HC RX W HCPCS: Performed by: ANESTHESIOLOGY

## 2023-01-23 PROCEDURE — A4217 STERILE WATER/SALINE, 500 ML: HCPCS | Performed by: NEUROLOGICAL SURGERY

## 2023-01-23 PROCEDURE — 72100 X-RAY EXAM L-S SPINE 2/3 VWS: CPT

## 2023-01-23 PROCEDURE — 2500000003 HC RX 250 WO HCPCS: Performed by: NURSE ANESTHETIST, CERTIFIED REGISTERED

## 2023-01-23 PROCEDURE — 85730 THROMBOPLASTIN TIME PARTIAL: CPT

## 2023-01-23 PROCEDURE — 6370000000 HC RX 637 (ALT 250 FOR IP): Performed by: ANESTHESIOLOGY

## 2023-01-23 PROCEDURE — 6370000000 HC RX 637 (ALT 250 FOR IP): Performed by: NURSE PRACTITIONER

## 2023-01-23 PROCEDURE — 2720000010 HC SURG SUPPLY STERILE: Performed by: NEUROLOGICAL SURGERY

## 2023-01-23 PROCEDURE — 2500000003 HC RX 250 WO HCPCS: Performed by: NEUROLOGICAL SURGERY

## 2023-01-23 PROCEDURE — 3600000014 HC SURGERY LEVEL 4 ADDTL 15MIN: Performed by: NEUROLOGICAL SURGERY

## 2023-01-23 PROCEDURE — 2709999900 HC NON-CHARGEABLE SUPPLY: Performed by: NEUROLOGICAL SURGERY

## 2023-01-23 PROCEDURE — C1713 ANCHOR/SCREW BN/BN,TIS/BN: HCPCS | Performed by: NEUROLOGICAL SURGERY

## 2023-01-23 PROCEDURE — 1200000000 HC SEMI PRIVATE

## 2023-01-23 PROCEDURE — 3600000004 HC SURGERY LEVEL 4 BASE: Performed by: NEUROLOGICAL SURGERY

## 2023-01-23 PROCEDURE — 86850 RBC ANTIBODY SCREEN: CPT

## 2023-01-23 PROCEDURE — 3700000001 HC ADD 15 MINUTES (ANESTHESIA): Performed by: NEUROLOGICAL SURGERY

## 2023-01-23 PROCEDURE — 3209999900 FLUORO FOR SURGICAL PROCEDURES

## 2023-01-23 DEVICE — SET SCR SPNL TI SGL INNR FOR VIPER 2 MINIMALLY INVASIVE: Type: IMPLANTABLE DEVICE | Site: SPINE LUMBAR | Status: FUNCTIONAL

## 2023-01-23 DEVICE — ROD SPNL L45MM POST PEDCL TI LORDOSED VIPER 2: Type: IMPLANTABLE DEVICE | Site: SPINE LUMBAR | Status: FUNCTIONAL

## 2023-01-23 DEVICE — BONE GRAFT KIT 7510100 INFUSE X SMALL
Type: IMPLANTABLE DEVICE | Site: SPINE LUMBAR | Status: FUNCTIONAL
Brand: INFUSE® BONE GRAFT

## 2023-01-23 DEVICE — SCREW SPNL L20MM DIA4MM SCLEROTIC TI ALLY ST LOK FN TIP: Type: IMPLANTABLE DEVICE | Site: SPINE LUMBAR | Status: FUNCTIONAL

## 2023-01-23 DEVICE — SPACER SPNL SM H12X8.4MM 10DEG 2.2CC ANTR LUM INTBDY FUS: Type: IMPLANTABLE DEVICE | Site: SPINE LUMBAR | Status: FUNCTIONAL

## 2023-01-23 DEVICE — SCREW SPNL L45MM OD6MM CORT FIX TI POLYAX FEN EXT TAB MIS: Type: IMPLANTABLE DEVICE | Site: SPINE LUMBAR | Status: FUNCTIONAL

## 2023-01-23 DEVICE — GRAFT BNE MED 6.25 CC MTRX FIBERGRAFT BG: Type: IMPLANTABLE DEVICE | Site: SPINE LUMBAR | Status: FUNCTIONAL

## 2023-01-23 DEVICE — ROD SPNL L40MM POST PEDCL TI LORDOSED VIPER 2: Type: IMPLANTABLE DEVICE | Site: SPINE LUMBAR | Status: FUNCTIONAL

## 2023-01-23 RX ORDER — REMIFENTANIL HYDROCHLORIDE 1 MG/ML
INJECTION, POWDER, LYOPHILIZED, FOR SOLUTION INTRAVENOUS CONTINUOUS PRN
Status: DISCONTINUED | OUTPATIENT
Start: 2023-01-23 | End: 2023-01-23 | Stop reason: SDUPTHER

## 2023-01-23 RX ORDER — SODIUM CHLORIDE 9 MG/ML
INJECTION, SOLUTION INTRAVENOUS PRN
Status: DISCONTINUED | OUTPATIENT
Start: 2023-01-23 | End: 2023-01-26 | Stop reason: HOSPADM

## 2023-01-23 RX ORDER — LIDOCAINE HYDROCHLORIDE 20 MG/ML
INJECTION, SOLUTION INTRAVENOUS PRN
Status: DISCONTINUED | OUTPATIENT
Start: 2023-01-23 | End: 2023-01-23 | Stop reason: SDUPTHER

## 2023-01-23 RX ORDER — SODIUM CHLORIDE 9 MG/ML
INJECTION, SOLUTION INTRAVENOUS PRN
Status: DISCONTINUED | OUTPATIENT
Start: 2023-01-23 | End: 2023-01-23 | Stop reason: HOSPADM

## 2023-01-23 RX ORDER — SODIUM CHLORIDE 0.9 % (FLUSH) 0.9 %
5-40 SYRINGE (ML) INJECTION EVERY 12 HOURS SCHEDULED
Status: DISCONTINUED | OUTPATIENT
Start: 2023-01-23 | End: 2023-01-26 | Stop reason: HOSPADM

## 2023-01-23 RX ORDER — ONDANSETRON 2 MG/ML
4 INJECTION INTRAMUSCULAR; INTRAVENOUS EVERY 6 HOURS PRN
Status: DISCONTINUED | OUTPATIENT
Start: 2023-01-23 | End: 2023-01-26 | Stop reason: HOSPADM

## 2023-01-23 RX ORDER — FAMOTIDINE 20 MG/1
20 TABLET, FILM COATED ORAL 2 TIMES DAILY
Status: DISCONTINUED | OUTPATIENT
Start: 2023-01-23 | End: 2023-01-26 | Stop reason: HOSPADM

## 2023-01-23 RX ORDER — MIDAZOLAM HYDROCHLORIDE 1 MG/ML
INJECTION INTRAMUSCULAR; INTRAVENOUS PRN
Status: DISCONTINUED | OUTPATIENT
Start: 2023-01-23 | End: 2023-01-23 | Stop reason: SDUPTHER

## 2023-01-23 RX ORDER — LABETALOL HYDROCHLORIDE 5 MG/ML
10 INJECTION, SOLUTION INTRAVENOUS
Status: DISCONTINUED | OUTPATIENT
Start: 2023-01-23 | End: 2023-01-23 | Stop reason: HOSPADM

## 2023-01-23 RX ORDER — PROPOFOL 10 MG/ML
INJECTION, EMULSION INTRAVENOUS CONTINUOUS PRN
Status: DISCONTINUED | OUTPATIENT
Start: 2023-01-23 | End: 2023-01-23 | Stop reason: SDUPTHER

## 2023-01-23 RX ORDER — SODIUM CHLORIDE 9 MG/ML
INJECTION, SOLUTION INTRAVENOUS CONTINUOUS PRN
Status: DISCONTINUED | OUTPATIENT
Start: 2023-01-23 | End: 2023-01-23 | Stop reason: SDUPTHER

## 2023-01-23 RX ORDER — METOCLOPRAMIDE HYDROCHLORIDE 5 MG/ML
10 INJECTION INTRAMUSCULAR; INTRAVENOUS
Status: DISCONTINUED | OUTPATIENT
Start: 2023-01-23 | End: 2023-01-23 | Stop reason: HOSPADM

## 2023-01-23 RX ORDER — SODIUM CHLORIDE 0.9 % (FLUSH) 0.9 %
5-40 SYRINGE (ML) INJECTION PRN
Status: DISCONTINUED | OUTPATIENT
Start: 2023-01-23 | End: 2023-01-26 | Stop reason: HOSPADM

## 2023-01-23 RX ORDER — MIDAZOLAM HYDROCHLORIDE 1 MG/ML
2 INJECTION INTRAMUSCULAR; INTRAVENOUS ONCE
Status: COMPLETED | OUTPATIENT
Start: 2023-01-23 | End: 2023-01-23

## 2023-01-23 RX ORDER — SODIUM CHLORIDE 0.9 % (FLUSH) 0.9 %
5-40 SYRINGE (ML) INJECTION PRN
Status: DISCONTINUED | OUTPATIENT
Start: 2023-01-23 | End: 2023-01-23 | Stop reason: HOSPADM

## 2023-01-23 RX ORDER — GABAPENTIN 600 MG/1
600 TABLET ORAL 2 TIMES DAILY
Status: DISCONTINUED | OUTPATIENT
Start: 2023-01-23 | End: 2023-01-26 | Stop reason: HOSPADM

## 2023-01-23 RX ORDER — ROCURONIUM BROMIDE 10 MG/ML
INJECTION, SOLUTION INTRAVENOUS PRN
Status: DISCONTINUED | OUTPATIENT
Start: 2023-01-23 | End: 2023-01-23 | Stop reason: SDUPTHER

## 2023-01-23 RX ORDER — ONDANSETRON 2 MG/ML
INJECTION INTRAMUSCULAR; INTRAVENOUS PRN
Status: DISCONTINUED | OUTPATIENT
Start: 2023-01-23 | End: 2023-01-23 | Stop reason: SDUPTHER

## 2023-01-23 RX ORDER — ACETAMINOPHEN 500 MG
1000 TABLET ORAL
Status: DISCONTINUED | OUTPATIENT
Start: 2023-01-23 | End: 2023-01-23 | Stop reason: HOSPADM

## 2023-01-23 RX ORDER — ONDANSETRON 4 MG/1
4 TABLET, ORALLY DISINTEGRATING ORAL EVERY 8 HOURS PRN
Status: DISCONTINUED | OUTPATIENT
Start: 2023-01-23 | End: 2023-01-26 | Stop reason: HOSPADM

## 2023-01-23 RX ORDER — CEFAZOLIN SODIUM 1 G/3ML
INJECTION, POWDER, FOR SOLUTION INTRAMUSCULAR; INTRAVENOUS PRN
Status: DISCONTINUED | OUTPATIENT
Start: 2023-01-23 | End: 2023-01-23 | Stop reason: SDUPTHER

## 2023-01-23 RX ORDER — HYDROCODONE BITARTRATE AND ACETAMINOPHEN 5; 325 MG/1; MG/1
1 TABLET ORAL EVERY 4 HOURS PRN
Status: DISCONTINUED | OUTPATIENT
Start: 2023-01-23 | End: 2023-01-24

## 2023-01-23 RX ORDER — IPRATROPIUM BROMIDE AND ALBUTEROL SULFATE 2.5; .5 MG/3ML; MG/3ML
1 SOLUTION RESPIRATORY (INHALATION)
Status: DISCONTINUED | OUTPATIENT
Start: 2023-01-23 | End: 2023-01-23 | Stop reason: HOSPADM

## 2023-01-23 RX ORDER — PROPOFOL 10 MG/ML
INJECTION, EMULSION INTRAVENOUS PRN
Status: DISCONTINUED | OUTPATIENT
Start: 2023-01-23 | End: 2023-01-23 | Stop reason: SDUPTHER

## 2023-01-23 RX ORDER — SODIUM CHLORIDE, SODIUM LACTATE, POTASSIUM CHLORIDE, CALCIUM CHLORIDE 600; 310; 30; 20 MG/100ML; MG/100ML; MG/100ML; MG/100ML
INJECTION, SOLUTION INTRAVENOUS CONTINUOUS PRN
Status: DISCONTINUED | OUTPATIENT
Start: 2023-01-23 | End: 2023-01-23 | Stop reason: SDUPTHER

## 2023-01-23 RX ORDER — SODIUM CHLORIDE, SODIUM LACTATE, POTASSIUM CHLORIDE, CALCIUM CHLORIDE 600; 310; 30; 20 MG/100ML; MG/100ML; MG/100ML; MG/100ML
INJECTION, SOLUTION INTRAVENOUS CONTINUOUS
Status: DISCONTINUED | OUTPATIENT
Start: 2023-01-23 | End: 2023-01-23 | Stop reason: HOSPADM

## 2023-01-23 RX ORDER — NALOXONE HYDROCHLORIDE 0.4 MG/ML
0.2 INJECTION, SOLUTION INTRAMUSCULAR; INTRAVENOUS; SUBCUTANEOUS PRN
Status: DISCONTINUED | OUTPATIENT
Start: 2023-01-23 | End: 2023-01-26 | Stop reason: HOSPADM

## 2023-01-23 RX ORDER — HYDROMORPHONE HCL 110MG/55ML
PATIENT CONTROLLED ANALGESIA SYRINGE INTRAVENOUS PRN
Status: DISCONTINUED | OUTPATIENT
Start: 2023-01-23 | End: 2023-01-23 | Stop reason: SDUPTHER

## 2023-01-23 RX ORDER — MIDAZOLAM HYDROCHLORIDE 1 MG/ML
INJECTION INTRAMUSCULAR; INTRAVENOUS
Status: COMPLETED
Start: 2023-01-23 | End: 2023-01-23

## 2023-01-23 RX ORDER — HYDRALAZINE HYDROCHLORIDE 20 MG/ML
10 INJECTION INTRAMUSCULAR; INTRAVENOUS
Status: DISCONTINUED | OUTPATIENT
Start: 2023-01-23 | End: 2023-01-23 | Stop reason: HOSPADM

## 2023-01-23 RX ORDER — METHOCARBAMOL 750 MG/1
750 TABLET, FILM COATED ORAL EVERY 6 HOURS SCHEDULED
Status: DISCONTINUED | OUTPATIENT
Start: 2023-01-24 | End: 2023-01-24

## 2023-01-23 RX ORDER — FENTANYL CITRATE 50 UG/ML
INJECTION, SOLUTION INTRAMUSCULAR; INTRAVENOUS PRN
Status: DISCONTINUED | OUTPATIENT
Start: 2023-01-23 | End: 2023-01-23 | Stop reason: SDUPTHER

## 2023-01-23 RX ORDER — DULOXETIN HYDROCHLORIDE 60 MG/1
60 CAPSULE, DELAYED RELEASE ORAL DAILY
Status: DISCONTINUED | OUTPATIENT
Start: 2023-01-23 | End: 2023-01-26 | Stop reason: HOSPADM

## 2023-01-23 RX ORDER — GLYCOPYRROLATE 1 MG/5 ML
SYRINGE (ML) INTRAVENOUS PRN
Status: DISCONTINUED | OUTPATIENT
Start: 2023-01-23 | End: 2023-01-23 | Stop reason: SDUPTHER

## 2023-01-23 RX ORDER — LIDOCAINE HYDROCHLORIDE 10 MG/ML
1 INJECTION, SOLUTION EPIDURAL; INFILTRATION; INTRACAUDAL; PERINEURAL
Status: DISCONTINUED | OUTPATIENT
Start: 2023-01-23 | End: 2023-01-23 | Stop reason: HOSPADM

## 2023-01-23 RX ORDER — SODIUM CHLORIDE 9 MG/ML
INJECTION, SOLUTION INTRAVENOUS CONTINUOUS
Status: DISCONTINUED | OUTPATIENT
Start: 2023-01-23 | End: 2023-01-24

## 2023-01-23 RX ORDER — SUCCINYLCHOLINE/SOD CL,ISO/PF 200MG/10ML
SYRINGE (ML) INTRAVENOUS PRN
Status: DISCONTINUED | OUTPATIENT
Start: 2023-01-23 | End: 2023-01-23 | Stop reason: SDUPTHER

## 2023-01-23 RX ORDER — PROCHLORPERAZINE EDISYLATE 5 MG/ML
5 INJECTION INTRAMUSCULAR; INTRAVENOUS
Status: DISCONTINUED | OUTPATIENT
Start: 2023-01-23 | End: 2023-01-23 | Stop reason: HOSPADM

## 2023-01-23 RX ORDER — HYDROCODONE BITARTRATE AND ACETAMINOPHEN 5; 325 MG/1; MG/1
2 TABLET ORAL EVERY 4 HOURS PRN
Status: DISCONTINUED | OUTPATIENT
Start: 2023-01-23 | End: 2023-01-24

## 2023-01-23 RX ORDER — DIAZEPAM 5 MG/1
5 TABLET ORAL EVERY 6 HOURS PRN
Status: DISCONTINUED | OUTPATIENT
Start: 2023-01-23 | End: 2023-01-26 | Stop reason: HOSPADM

## 2023-01-23 RX ORDER — SENNA PLUS 8.6 MG/1
2 TABLET ORAL 2 TIMES DAILY
Status: DISCONTINUED | OUTPATIENT
Start: 2023-01-23 | End: 2023-01-26 | Stop reason: HOSPADM

## 2023-01-23 RX ORDER — SODIUM CHLORIDE 0.9 % (FLUSH) 0.9 %
5-40 SYRINGE (ML) INJECTION EVERY 12 HOURS SCHEDULED
Status: DISCONTINUED | OUTPATIENT
Start: 2023-01-23 | End: 2023-01-23 | Stop reason: HOSPADM

## 2023-01-23 RX ORDER — MEPERIDINE HYDROCHLORIDE 25 MG/ML
12.5 INJECTION INTRAMUSCULAR; INTRAVENOUS; SUBCUTANEOUS EVERY 5 MIN PRN
Status: DISCONTINUED | OUTPATIENT
Start: 2023-01-23 | End: 2023-01-23 | Stop reason: HOSPADM

## 2023-01-23 RX ORDER — FENTANYL CITRATE 50 UG/ML
25 INJECTION, SOLUTION INTRAMUSCULAR; INTRAVENOUS EVERY 5 MIN PRN
Status: DISCONTINUED | OUTPATIENT
Start: 2023-01-23 | End: 2023-01-23 | Stop reason: HOSPADM

## 2023-01-23 RX ORDER — LIDOCAINE 4 G/G
1 PATCH TOPICAL DAILY
Status: DISCONTINUED | OUTPATIENT
Start: 2023-01-23 | End: 2023-01-26 | Stop reason: HOSPADM

## 2023-01-23 RX ORDER — POLYETHYLENE GLYCOL 3350 17 G/17G
17 POWDER, FOR SOLUTION ORAL DAILY
Status: DISCONTINUED | OUTPATIENT
Start: 2023-01-23 | End: 2023-01-26 | Stop reason: HOSPADM

## 2023-01-23 RX ORDER — LACOSAMIDE 50 MG/1
50 TABLET ORAL 2 TIMES DAILY
Status: DISCONTINUED | OUTPATIENT
Start: 2023-01-23 | End: 2023-01-26 | Stop reason: HOSPADM

## 2023-01-23 RX ORDER — ENOXAPARIN SODIUM 100 MG/ML
40 INJECTION SUBCUTANEOUS DAILY
Status: DISCONTINUED | OUTPATIENT
Start: 2023-01-24 | End: 2023-01-26 | Stop reason: HOSPADM

## 2023-01-23 RX ADMIN — MIDAZOLAM 2 MG: 1 INJECTION INTRAMUSCULAR; INTRAVENOUS at 12:40

## 2023-01-23 RX ADMIN — HYDROMORPHONE HYDROCHLORIDE 2 MG: 1 INJECTION, SOLUTION INTRAMUSCULAR; INTRAVENOUS; SUBCUTANEOUS at 21:55

## 2023-01-23 RX ADMIN — SODIUM CHLORIDE, SODIUM LACTATE, POTASSIUM CHLORIDE, AND CALCIUM CHLORIDE: .6; .31; .03; .02 INJECTION, SOLUTION INTRAVENOUS at 13:15

## 2023-01-23 RX ADMIN — LACOSAMIDE 50 MG: 50 TABLET, FILM COATED ORAL at 20:34

## 2023-01-23 RX ADMIN — HYDROMORPHONE HYDROCHLORIDE 1 MG: 1 INJECTION, SOLUTION INTRAMUSCULAR; INTRAVENOUS; SUBCUTANEOUS at 23:51

## 2023-01-23 RX ADMIN — POLYETHYLENE GLYCOL (3350) 17 G: 17 POWDER, FOR SOLUTION ORAL at 20:35

## 2023-01-23 RX ADMIN — HYDROMORPHONE HYDROCHLORIDE 0.5 MG: 1 INJECTION, SOLUTION INTRAMUSCULAR; INTRAVENOUS; SUBCUTANEOUS at 17:40

## 2023-01-23 RX ADMIN — SODIUM CHLORIDE: 9 INJECTION, SOLUTION INTRAVENOUS at 21:58

## 2023-01-23 RX ADMIN — HYDROMORPHONE HYDROCHLORIDE 0.8 MG: 2 INJECTION, SOLUTION INTRAMUSCULAR; INTRAVENOUS; SUBCUTANEOUS at 14:03

## 2023-01-23 RX ADMIN — ROCURONIUM BROMIDE 40 MG: 10 INJECTION INTRAVENOUS at 13:48

## 2023-01-23 RX ADMIN — Medication 140 MG: at 13:21

## 2023-01-23 RX ADMIN — LIDOCAINE HYDROCHLORIDE 50 MG: 20 INJECTION, SOLUTION INTRAVENOUS at 13:21

## 2023-01-23 RX ADMIN — FENTANYL CITRATE 50 MCG: 50 INJECTION, SOLUTION INTRAMUSCULAR; INTRAVENOUS at 13:37

## 2023-01-23 RX ADMIN — FENTANYL CITRATE 50 MCG: 50 INJECTION, SOLUTION INTRAMUSCULAR; INTRAVENOUS at 14:52

## 2023-01-23 RX ADMIN — GABAPENTIN 600 MG: 600 TABLET, FILM COATED ORAL at 20:34

## 2023-01-23 RX ADMIN — ROCURONIUM BROMIDE 10 MG: 10 INJECTION INTRAVENOUS at 13:57

## 2023-01-23 RX ADMIN — DIAZEPAM 5 MG: 5 TABLET ORAL at 20:01

## 2023-01-23 RX ADMIN — SODIUM CHLORIDE, PRESERVATIVE FREE 10 ML: 5 INJECTION INTRAVENOUS at 20:37

## 2023-01-23 RX ADMIN — SENNOSIDES 17.2 MG: 8.6 TABLET, COATED ORAL at 20:34

## 2023-01-23 RX ADMIN — MIDAZOLAM 2 MG: 1 INJECTION INTRAMUSCULAR; INTRAVENOUS at 11:52

## 2023-01-23 RX ADMIN — SODIUM CHLORIDE: 9 INJECTION, SOLUTION INTRAVENOUS at 14:26

## 2023-01-23 RX ADMIN — DULOXETINE HYDROCHLORIDE 60 MG: 60 CAPSULE, DELAYED RELEASE ORAL at 20:34

## 2023-01-23 RX ADMIN — SUGAMMADEX 100 MG: 100 INJECTION, SOLUTION INTRAVENOUS at 15:35

## 2023-01-23 RX ADMIN — SODIUM CHLORIDE, SODIUM LACTATE, POTASSIUM CHLORIDE, CALCIUM CHLORIDE: 600; 310; 30; 20 INJECTION, SOLUTION INTRAVENOUS at 13:15

## 2023-01-23 RX ADMIN — PROPOFOL 200 MCG/KG/MIN: 10 INJECTION, EMULSION INTRAVENOUS at 13:18

## 2023-01-23 RX ADMIN — FENTANYL CITRATE 100 MCG: 50 INJECTION, SOLUTION INTRAMUSCULAR; INTRAVENOUS at 15:11

## 2023-01-23 RX ADMIN — METHOCARBAMOL 1000 MG: 100 INJECTION, SOLUTION INTRAMUSCULAR; INTRAVENOUS at 17:28

## 2023-01-23 RX ADMIN — HYDROMORPHONE HYDROCHLORIDE 0.5 MG: 1 INJECTION, SOLUTION INTRAMUSCULAR; INTRAVENOUS; SUBCUTANEOUS at 17:47

## 2023-01-23 RX ADMIN — Medication 0.4 MG: at 13:40

## 2023-01-23 RX ADMIN — HYDROMORPHONE HYDROCHLORIDE 0.5 MG: 1 INJECTION, SOLUTION INTRAMUSCULAR; INTRAVENOUS; SUBCUTANEOUS at 17:57

## 2023-01-23 RX ADMIN — HYDROMORPHONE HYDROCHLORIDE 0.5 MG: 1 INJECTION, SOLUTION INTRAMUSCULAR; INTRAVENOUS; SUBCUTANEOUS at 18:31

## 2023-01-23 RX ADMIN — ONDANSETRON 4 MG: 2 INJECTION INTRAMUSCULAR; INTRAVENOUS at 16:46

## 2023-01-23 RX ADMIN — REMIFENTANIL HYDROCHLORIDE 0.2 MCG/KG/MIN: 1 INJECTION, POWDER, LYOPHILIZED, FOR SOLUTION INTRAVENOUS at 13:18

## 2023-01-23 RX ADMIN — MIDAZOLAM HYDROCHLORIDE 2 MG: 2 INJECTION, SOLUTION INTRAMUSCULAR; INTRAVENOUS at 12:51

## 2023-01-23 RX ADMIN — HYDROMORPHONE HYDROCHLORIDE 1.2 MG: 2 INJECTION, SOLUTION INTRAMUSCULAR; INTRAVENOUS; SUBCUTANEOUS at 14:19

## 2023-01-23 RX ADMIN — FAMOTIDINE 20 MG: 20 TABLET, FILM COATED ORAL at 20:34

## 2023-01-23 RX ADMIN — REMIFENTANIL HYDROCHLORIDE 0.2 MCG/KG/MIN: 1 INJECTION, POWDER, LYOPHILIZED, FOR SOLUTION INTRAVENOUS at 15:51

## 2023-01-23 RX ADMIN — HYDROMORPHONE HYDROCHLORIDE 0.5 MG: 1 INJECTION, SOLUTION INTRAMUSCULAR; INTRAVENOUS; SUBCUTANEOUS at 17:21

## 2023-01-23 RX ADMIN — HYDROMORPHONE HYDROCHLORIDE 1 MG: 1 INJECTION, SOLUTION INTRAMUSCULAR; INTRAVENOUS; SUBCUTANEOUS at 19:45

## 2023-01-23 RX ADMIN — ACETAMINOPHEN 1000 MG: 500 TABLET ORAL at 11:57

## 2023-01-23 RX ADMIN — HYDROMORPHONE HYDROCHLORIDE 0.5 MG: 1 INJECTION, SOLUTION INTRAMUSCULAR; INTRAVENOUS; SUBCUTANEOUS at 17:27

## 2023-01-23 RX ADMIN — PROPOFOL 200 MG: 10 INJECTION, EMULSION INTRAVENOUS at 13:21

## 2023-01-23 RX ADMIN — CEFAZOLIN SODIUM 2 G: 1 POWDER, FOR SOLUTION INTRAMUSCULAR; INTRAVENOUS at 13:18

## 2023-01-23 RX ADMIN — HYDROCODONE BITARTRATE AND ACETAMINOPHEN 2 TABLET: 5; 325 TABLET ORAL at 20:34

## 2023-01-23 ASSESSMENT — PAIN DESCRIPTION - PAIN TYPE
TYPE: SURGICAL PAIN

## 2023-01-23 ASSESSMENT — PAIN DESCRIPTION - DESCRIPTORS
DESCRIPTORS: ACHING
DESCRIPTORS: SHARP;BURNING;ACHING
DESCRIPTORS: ACHING

## 2023-01-23 ASSESSMENT — PAIN DESCRIPTION - ORIENTATION
ORIENTATION: MID
ORIENTATION: LOWER
ORIENTATION: MID
ORIENTATION: LOWER
ORIENTATION: LEFT
ORIENTATION: LOWER
ORIENTATION: LOWER
ORIENTATION: MID
ORIENTATION: LOWER
ORIENTATION: LOWER

## 2023-01-23 ASSESSMENT — PAIN SCALES - GENERAL
PAINLEVEL_OUTOF10: 10
PAINLEVEL_OUTOF10: 10
PAINLEVEL_OUTOF10: 9
PAINLEVEL_OUTOF10: 10
PAINLEVEL_OUTOF10: 9
PAINLEVEL_OUTOF10: 10
PAINLEVEL_OUTOF10: 10
PAINLEVEL_OUTOF10: 9
PAINLEVEL_OUTOF10: 10
PAINLEVEL_OUTOF10: 7
PAINLEVEL_OUTOF10: 8
PAINLEVEL_OUTOF10: 8
PAINLEVEL_OUTOF10: 0

## 2023-01-23 ASSESSMENT — PAIN DESCRIPTION - LOCATION
LOCATION: BACK
LOCATION: BACK
LOCATION: BACK;BUTTOCKS
LOCATION: BACK

## 2023-01-23 ASSESSMENT — PAIN DESCRIPTION - FREQUENCY
FREQUENCY: CONTINUOUS

## 2023-01-23 ASSESSMENT — PAIN DESCRIPTION - ONSET
ONSET: ON-GOING

## 2023-01-23 ASSESSMENT — PAIN - FUNCTIONAL ASSESSMENT: PAIN_FUNCTIONAL_ASSESSMENT: 0-10

## 2023-01-23 ASSESSMENT — LIFESTYLE VARIABLES: SMOKING_STATUS: 1

## 2023-01-23 NOTE — ANESTHESIA PRE PROCEDURE
Department of Anesthesiology  Preprocedure Note       Name:  Rula Mendiola   Age:  42 y.o.  :  1985                                          MRN:  [id number]         Date:  2023      Surgeon: Nicolas Velasquez):  MD Martha Heart MD    Procedure: Procedure(s):  L4-5 ANTERIOR LUMBAR INTERBODY FUSION WITH PEDICLE SCREW FIXATION, POSSIBLE FACETECTOMY  . Medications prior to admission:   Prior to Admission medications    Medication Sig Start Date End Date Taking? Authorizing Provider   Ketamine HCl 100 MG TROC Take 25 mg by mouth 3 times daily    Historical Provider, MD   lacosamide (VIMPAT) 50 MG TABS tablet Take 1 tablet by mouth twice daily 22  Xi London MD   gabapentin (NEURONTIN) 600 MG tablet Take 1 tablet by mouth twice daily 22  Xi London MD   ondansetron (ZOFRAN-ODT) 8 MG TBDP disintegrating tablet Place 1 tablet under the tongue every 8 hours as needed for Nausea or Vomiting 22   Chelsea Lewis APRN - CNP   traMADol (ULTRAM) 50 MG tablet TAKE 1 TABLET BY MOUTH THREE TIMES DAILY 3/11/22   Historical Provider, MD   DULoxetine (CYMBALTA) 60 MG capsule Take 60 mg by mouth daily    Historical Provider, MD       Current medications:    No current facility-administered medications for this encounter. Allergies:     Allergies   Allergen Reactions    Flexeril [Cyclobenzaprine]      Altered mental status    Minocycline Anaphylaxis    Toradol [Ketorolac Tromethamine]      Passes out    Antihistamines, Chlorpheniramine-Type Other (See Comments)     Pt states she passes out    Benadryl [Diphenhydramine] Other (See Comments)     Black out    Carbamazepine Other (See Comments)     HYPOTENSION    Dextromethorphan Other (See Comments)     PASSED OUT    Divalproex Sodium Other (See Comments)     HYPOTENSION    Fosphenytoin Other (See Comments)     HYPOTENSION    Hydroxyzine Hcl Other (See Comments)     HYPOTENSION    Lamotrigine Other (See Comments)     HYPOTENSION    Levetiracetam Hives    Morphine And Related Other (See Comments)     CHEST PRESSURE    Other      All seizure meds, except gabapentin. Different reactions with each med  All seizure meds, except gabapentin. Different reactions with each med      Oxcarbazepine Other (See Comments)     AGITATION    Oxycodone-Acetaminophen Other (See Comments)     Aggressiveness     Okay with vicodin dilaudid demerol  Aggressiveness     Okay with vicodin dilaudid demerol  Agressiveness; Okay with Vicodin, Dilaudid, Demerol  Does not like      Reglan [Metoclopramide]      Passes out    Topiramate Other (See Comments)     MIGRANE    Metronidazole Rash     Seizures. 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    Thalassemia trait D56.3    Spells [id number]    Right hip pain M25.551    Partial epilepsy with impairment of consciousness, intractable ([address] G40.219    Kidney stones N20.0    Generalized anxiety disorder F41.1    Ganglion cyst M67.40    Seizure ([address] R56.9    Coccyxdynia M53.3    Chronic pain G89.29    ADHD (attention deficit hyperactivity disorder) F90.9    Acne L70.9       Past Medical History:        Diagnosis Date    Anesthesia     PATIENT STATES \" IT'S DIFFICULT TO PUT ME TO SLEEP\"    Anxiety     Autoimmune disease ([address]     Broken jaw ([address]     titanum plate    Bronchiolitis     H/O renal failure 2013    left kidney    History of kidney stones     IBS (irritable bowel syndrome)     Kidney stones     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 ...temporal lobe epilepsy    Thalassemia minor     Visceral hypersensitivity syndrome        Past Surgical History:        Procedure Laterality Date    CHOLECYSTECTOMY      CYST REMOVAL      HERNIA REPAIR      right inguinal    LITHOTRIPSY      OTHER SURGICAL HISTORY      titanum plate upper jaw d/t fracture    TONSILLECTOMY      UPPER GASTROINTESTINAL ENDOSCOPY  2014    biopsies       Social History:    Social History     Tobacco Use    Smoking status: Former     Packs/day: 0.50     Years: 15.00     Pack years: 7.50     Types: Cigarettes     Quit date: 1/15/2023     Years since quittin.0    Smokeless tobacco: Never   Substance Use Topics    Alcohol use: No                                Counseling given: Not Answered      Vital Signs (Current):   Vitals:    23 1523   Weight: 127 lb (57.6 kg)   Height: 5' 6\" (1.676 m)                                              BP Readings from Last 3 Encounters:   22 122/84   22 120/80   22 102/68       NPO Status:                                                                                 BMI:   Wt Readings from Last 3 Encounters:   23 127 lb (57.6 kg)   22 134 lb (60.8 kg)   05/10/22 134 lb (60.8 kg)     Body mass index is 20.5 kg/m².     CBC:   Lab Results   Component Value Date/Time    WBC 5.8 2022 10:21 AM    RBC 5.42 2022 10:21 AM    HGB 12.4 2022 10:21 AM    HCT 38.8 2022 10:21 AM    MCV 71.7 2022 10:21 AM    RDW 18.2 2022 10:21 AM     2022 10:21 AM       CMP:   Lab Results   Component Value Date/Time     2022 10:21 AM    K 4.3 2022 10:21 AM    K 4.3 2021 12:15 PM    CL 99 2022 10:21 AM    CO2 23 2022 10:21 AM    BUN 9 2022 10:21 AM    CREATININE 0.6 2022 10:21 AM    GFRAA >60 2022 10:21 AM    AGRATIO 2.2 2022 10:21 AM    LABGLOM >60 2022 10:21 AM    GLUCOSE 97 2022 10:21 AM    PROT 7.0 2022 10:21 AM    CALCIUM 9.5 2022 10:21 AM    BILITOT 0.4 2022 10:21 AM    ALKPHOS 55 2022 10:21 AM    AST 15 2022 10:21 AM    ALT 8 04/06/2022 10:21 AM       POC Tests: No results for input(s): POCGLU, POCNA, POCK, POCCL, POCBUN, POCHEMO, POCHCT in the last 72 hours. Coags: No results found for: PROTIME, INR, APTT    HCG (If Applicable):   Lab Results   Component Value Date    PREGTESTUR Negative 05/16/2018        ABGs: No results found for: PHART, PO2ART, BYA5YQE, LAG8APS, BEART, F5AFXJRD     Type & Screen (If Applicable):  No results found for: LABABO, LABRH    Drug/Infectious Status (If Applicable):  No results found for: HIV, HEPCAB    COVID-19 Screening (If Applicable):   Lab Results   Component Value Date/Time    COVID19 Not Detected 12/23/2021 12:15 PM           Anesthesia Evaluation  Patient summary reviewed and Nursing notes reviewed no history of anesthetic complications:   Airway: Mallampati: I  TM distance: >3 FB   Neck ROM: full  Mouth opening: > = 3 FB   Dental:    (+) implants      Pulmonary:normal exam  breath sounds clear to auscultation  (+) current smoker          Patient did not smoke on day of surgery. Cardiovascular:Negative CV ROS  Exercise tolerance: good (>4 METS),           Rhythm: regular  Rate: normal                    Neuro/Psych:   (+) seizures: well controlled, psychiatric history: stable with treatmentdepression/anxiety             GI/Hepatic/Renal: Neg GI/Hepatic/Renal ROS            Endo/Other: Negative Endo/Other ROS                    Abdominal:       Abdomen: soft. Vascular: Other Findings:           Anesthesia Plan      general     ASA 2       Induction: intravenous. MIPS: Postoperative opioids intended and Prophylactic antiemetics administered. Anesthetic plan and risks discussed with patient. Plan discussed with CRNA.     Attending anesthesiologist reviewed and agrees with Preprocedure content                Corbin Montilla DO   1/23/2023

## 2023-01-23 NOTE — PROGRESS NOTES
PACU Transfer to Floor Note    Procedure(s):  L4-5 ANTERIOR LUMBAR INTERBODY FUSION WITH PEDICLE SCREW FIXATION, POSSIBLE FACETECTOMY    Current Allergies: Flexeril [cyclobenzaprine]; Minocycline; Toradol [ketorolac tromethamine]; Antihistamines, chlorpheniramine-type; Benadryl [diphenhydramine]; Carbamazepine; Dextromethorphan; Divalproex sodium; Fosphenytoin; Hydroxyzine hcl; Lamotrigine; Levetiracetam; Morphine and related; Other; Oxcarbazepine; Oxycodone-acetaminophen; Reglan [metoclopramide]; Topiramate; Metronidazole; and Sudafed [pseudoephedrine hcl]    Pt meets criteria as per Renee Score and ASPAN Standards to transfer to next phase of care. No results for input(s): POCGLU in the last 72 hours. Vitals:    01/23/23 1830   BP: 122/88   Pulse: 91   Resp: 17   Temp: 97.2 °F (36.2 °C)   SpO2: 100%     Vitals within 20% of pt's admission vitals as per RENEE SCORE    SpO2: 100 %    O2 Flow Rate (L/min): 2 L/min      Intake/Output Summary (Last 24 hours) at 1/23/2023 1839  Last data filed at 1/23/2023 1817  Gross per 24 hour   Intake 1350 ml   Output 200 ml   Net 1150 ml       Pain assessment:  present - adequately treated    Pain Level: 7    Patient was assessed for alterations to skin integrity. There were not alterations observed. Is patient incontinent: no, MCKEON in place    PACU RN called and updated patient's family. Patient has all belongings at discharge from PACU. Handoff report given at bedside.    Family updated and directed to pt room 5514      1/23/2023 6:39 PM

## 2023-01-23 NOTE — OP NOTE
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 Surgeon(s):  MD Zee Cuevas MD    Assistant:   Surgical Assistant: Maribel Turner    Anesthesia: General    Estimated Blood Loss (mL): less than 170     Complications: None    Specimens:   * No specimens in log *    Implants:  * No implants in log *      Drains:   Urinary Catheter 01/23/23 Molina (Active)       Detailed Description of Procedure:   Detailed Description of Procedure: The patient is a 42year-old female with  longstanding history of chronic back and leg pain. She was evaluated by  Dr. Merrill Gustafson and was felt to require anterior fusion of the L4-L5 disk space. I met the patient preoperatively and had an  extensive discussion with her regarding the risks related to exposure. Risks discussed included bleeding; infection; the possibility of bowel,  bladder, or ureteral injuries; possibility of nerve damage;  paresthesias; hernias or lymph leaks; the possibility of arterial or  venous thrombosis requiring thrombectomy or bypass, and the possibility  of retroperitoneal fluid collection requiring drainage were all  extensively discussed. The patient understood these risks and wished to  proceed. PROCEDURE IN DETAIL:  The patient was taken to the operating room,  placed on the operating table in supine position. General endotracheal  anesthesia was induced. IV antibiotics were administered and Molina  catheter was placed. Preoperative localization of disk space was  carried out with fluoroscopy. The abdomen was then prepped and draped  in the usual sterile fashion.   A left paramedian incision was made extending  from the umbilicus to just above the pubic symphysis. I dissected  through the subcutaneous tissues and identified the left anterior rectus  sheath which was incised. The left rectus abdominis muscle was  mobilized from the midline toward the left side. Retroperitoneum was  entered in the left lower quadrant. Peritoneal contents were swept  toward the midline. Bookwalter retractor was placed in the field. I then used a bipolar cautery on the lateral aspect of the left common  iliac artery and vein. The left iliolumbar vein was ligated. Segmental  vessels were clipped and ligated, and we had good exposure of the L4-L5  disk space. A small segmental bleeder was noted which I repaired with a 5.0 prolene suture. Disk marker was placed at that level to confirm the  level of the disk space as well as midline. Once that was completed,  diskectomy and cage placement were carried out by Dr. Belinda Crandall at the   L4-5 level. I then carefully inspected the wound for hemostasis. Once  hemostasis was ensured, I reapproximated the anterior rectus sheath with  0 PDS silk sutures. Subcutaneous tissue was reapproximated in two  layers with 3-0 Vicryl and skin was closed with 4-0 Monocryl. Fluoroscopy sweep confirmed no retained sponges or instruments. I was  present for the entire anterior portion of the procedure and I assisted  Dr. Belinda Crandall with his portion of the procedure which included soft  tissue and blood vessel retraction to facilitate implant placement.     Electronically signed by Fidel Valdez MD on 1/23/2023 at 2:59 PM

## 2023-01-23 NOTE — OP NOTE
Operative Report    PATIENT NAME: Paty Raphael  YOB: 1985  MEDICAL RECORD# [id number]  SURGERY DATE: 1/23/2023  SURGEON:  Austin Thakkar MD, PhD  ASSISTANT:  Katelyn Bustos PA-C. No qualified resident or fellow was available for this case. She provided assistance with opening/closure, placement of implants and protection of critical anatomy during the posterior portion of the operation.  DICTATED BY: Austin Thakkar MD, PhD      PREOPERATIVE DIAGNOSIS:  1.  Spondylolisthesis L4-5 associated with subarticular stenosis and radiculopathy     POSTOPERATIVE DIAGNOSIS:  1.   Same.     PROCEDURES PERFORMED:  1.  Anterior diskectomy at L4-5, retroperitoneal approach performed by Dr. Fernandez with interbody fusion   2.  Placement of Synthes SynFix ALIF lordosed cage with a medium footprint, packed with Fibergraft/Infuse, into the L4-5 interspace  3.  Placement of percutaneous Viper pedicle screws and rods into the pedicles of L4-5 bilaterally.  4.  Posterolateral facet arthrodesis at L4-5  5.  Intraoperative monitoring for motor and sensory potentials, stimulation of lumbosacral plexus, stimulation of pedicel screws  6.  Stereotactic computer-aided navigation using [employer] and [employer] intraoperative CT     ANESTHESIA:  General     IMPLANTS:  Synthes SynCage ALIF grafts at L4-5 and L5-S1  L4-5: 12 mm SynFix with 10 degree lordosis  Synthes cancellous SynFix screws into the body of L4 and L5  Viper Prime pedicle screws  L4: 6-0, 45 mm  L5: 6-0, 45 mm    INDICATIONS FOR SURGERY:  The patient is a 37 y.o. with intractable back and leg pain. Their symptoms failed to respond to conservative intervention. An MRI scan was performed and this demonstrated evidence of spondylolisthesis associated with radiculopathy. Having failed conservative management and experiencing persistent symptoms, the patient elected to proceed ahead with the surgical option of L4-L5 ALIF indirect decompression, as well as pedicle  screw fixation and fusion. DETAILS OF PROCEDURE: Under universal protocol and informed consent, the patient was brought to the operating room and general endotracheal anesthesia was induced. Intravenous antibiotics, 2 g Ancef, were administered. They were placed in a supine position on the operating table. The anterior abdominal region was prepped and draped in the routine fashion. Dr. Sarah Moffett will describe to opening and closure in a separate operative note. Good position was obtained at L4-5 in AP and lateral fluoro images. I entered with the anterior spine exposed at L4-5 with x-ray confirmation. An incision was made in the annulus. The discectomy was carried out in standard fashion using the periosteal elevator, the various shaped curettes, removing the disc down to the posterior longitudinal ligament. The various distractors were placed and I eventually chose a 12 mm, 10 degree lordosed cage with a medium footprint. The cage was packed with Fibergraft/Infuse mixed with autologous BMA. The cage was advanced into the disc space in good position re-establishing good lordosis. The 24 mm SynFix cancellous screw was advanced into L4 and L5 to prevent graft migration. AP and lateral x-rays showed satisfactory placement of the device and screws. 20cc Exparel was mixed with 20cc 1/2% Marcaine and was infiltrated into the anterior abdominal wall muscle and subcutaneous tissue. Dr. Sarah Moffett then performed a multi-layer closure of the abdomen, this will be described in a separate operative note. She was then placed prone on a Nate table using the [employer]. All bony prominences were inspected and padded prior to sterile draping. The lumbosacral area was then prepped and draped in the usual sterile fashion. A small stab incision was then made over the PSIS, two stay pins were affixed to the bone and the stereotactic array was secured.  The wound was covered with sterile draps and the Aero intraoperative CT was brought into the field to acquire imaging. The images were loaded into the COTA guidance system and used for pedicle screw placement. Using a #15 blade knife, the skin was incised 3.5 cm off the midline and centered at the L4-5 interspaces in a mirror image fashion bilaterally. I used the navigable Jamshidi needle to cannulate the pedicle of L4 on the right, a K wire was then placed in the pedicle and over the K wires was placed a Viper pedicle screw. This was all performed under real image guidance. The procedure was repeated at the L5 pedicle on the right, and the L4-5 pedicles on the left. The screws were stimulated to a threshold of 30 mV without nerve response. Excellent purchase was obtained. A posterolateral facet arthrodesis was performed at L4-5 by decorticating the lateral facet with the high speed drill and packing the facet articulation with allograft bone. Rods were then introduced and a reduction maneuver was performed to reduce the spondylolisthesis. A compression maneuver was then performed to lock the interbody graft in place. Neuro-monitoring was stable throughout the operation. The wound was then irrigated with antibiotic solution and was then closed in the usual fashion using interrupted 0 Vicryl sutures in the fascia, followed by running 4-0 Monocryl in the subcuticular layer. A Dermabond dressing was then applied. The patient was extubated in the operating room and transferred to the recovery room in stable condition. There were no complications. All needle, instrument, and sponge counts were correct. In accordance with CMS guidelines, I attest that I was present for the entire procedure from the creation of the skin incision to the closure. ESTIMATED BLOOD LOSS: 50 mL     COMPLICATIONS: No complications apparent. DISPOSITION: The patient was transferred to the PACU in stable condition, awake, alert, and moving all extremities on command. Dictated by: Carmen Millard MD

## 2023-01-23 NOTE — PROGRESS NOTES
Patient arrived to PACU post L4-5 ANTERIOR LUMBAR INTERBODY FUSION WITH PEDICLE SCREW FIXATION, POSSIBLE FACETECTOMY with Dr. Tariq Armendariz. VSS on arrival. Oral airway removed. CRNA gave PACU RN report at bedside stating patient received EXPAREL, Otherwise, no complications during procedure. Surgical sites to lower back, drainage noted. Surgical site to abdomen clean, dry and intact. Patient c/o pain on arrival. 10/10. Will continue to monitor.

## 2023-01-24 LAB
A/G RATIO: 1.8 (ref 1.1–2.2)
ALBUMIN SERPL-MCNC: 3.5 G/DL (ref 3.4–5)
ALP BLD-CCNC: 80 U/L (ref 40–129)
ALT SERPL-CCNC: 45 U/L (ref 10–40)
ANION GAP SERPL CALCULATED.3IONS-SCNC: 10 MMOL/L (ref 3–16)
APTT: 32.4 SEC (ref 23–34.3)
AST SERPL-CCNC: 68 U/L (ref 15–37)
BASOPHILS ABSOLUTE: 0 K/UL (ref 0–0.2)
BASOPHILS RELATIVE PERCENT: 0.5 %
BILIRUB SERPL-MCNC: 0.6 MG/DL (ref 0–1)
BUN BLDV-MCNC: 6 MG/DL (ref 7–20)
CALCIUM SERPL-MCNC: 8.5 MG/DL (ref 8.3–10.6)
CHLORIDE BLD-SCNC: 106 MMOL/L (ref 99–110)
CO2: 24 MMOL/L (ref 21–32)
CREAT SERPL-MCNC: <0.5 MG/DL (ref 0.6–1.1)
EOSINOPHILS ABSOLUTE: 0.2 K/UL (ref 0–0.6)
EOSINOPHILS RELATIVE PERCENT: 2.2 %
FERRITIN: 322.9 NG/ML (ref 15–150)
FOLATE: 4.41 NG/ML (ref 4.78–24.2)
GFR SERPL CREATININE-BSD FRML MDRD: >60 ML/MIN/{1.73_M2}
GLUCOSE BLD-MCNC: 91 MG/DL (ref 70–99)
HCT VFR BLD CALC: 32.6 % (ref 36–48)
HCT VFR BLD CALC: 33 % (ref 36–48)
HEMOGLOBIN: 10.5 G/DL (ref 12–16)
IMMATURE RETIC FRACT: 0.56 (ref 0.21–0.37)
INR BLD: 1.1 (ref 0.87–1.14)
IRON SATURATION: 46 % (ref 15–50)
IRON: 70 UG/DL (ref 37–145)
LYMPHOCYTES ABSOLUTE: 0.8 K/UL (ref 1–5.1)
LYMPHOCYTES RELATIVE PERCENT: 10.7 %
MCH RBC QN AUTO: 23 PG (ref 26–34)
MCHC RBC AUTO-ENTMCNC: 32.2 G/DL (ref 31–36)
MCV RBC AUTO: 71.4 FL (ref 80–100)
MONOCYTES ABSOLUTE: 0.8 K/UL (ref 0–1.3)
MONOCYTES RELATIVE PERCENT: 10.5 %
NEUTROPHILS ABSOLUTE: 5.4 K/UL (ref 1.7–7.7)
NEUTROPHILS RELATIVE PERCENT: 76.1 %
PDW BLD-RTO: 19.4 % (ref 12.4–15.4)
PLATELET # BLD: 204 K/UL (ref 135–450)
PMV BLD AUTO: 8.9 FL (ref 5–10.5)
POTASSIUM REFLEX MAGNESIUM: 4.1 MMOL/L (ref 3.5–5.1)
PROTHROMBIN TIME: 14.2 SEC (ref 11.7–14.5)
RBC # BLD: 4.56 M/UL (ref 4–5.2)
RETICULOCYTE ABSOLUTE COUNT: 0.1 M/UL (ref 0.02–0.1)
RETICULOCYTE COUNT PCT: 2.29 % (ref 0.5–2.18)
SODIUM BLD-SCNC: 140 MMOL/L (ref 136–145)
TOTAL IRON BINDING CAPACITY: 151 UG/DL (ref 260–445)
TOTAL PROTEIN: 5.4 G/DL (ref 6.4–8.2)
TSH REFLEX: 2.53 UIU/ML (ref 0.27–4.2)
VITAMIN B-12: 259 PG/ML (ref 211–911)
VITAMIN D 25-HYDROXY: 6.4 NG/ML
WBC # BLD: 7.1 K/UL (ref 4–11)

## 2023-01-24 PROCEDURE — 83550 IRON BINDING TEST: CPT

## 2023-01-24 PROCEDURE — 82746 ASSAY OF FOLIC ACID SERUM: CPT

## 2023-01-24 PROCEDURE — 2580000003 HC RX 258: Performed by: NURSE PRACTITIONER

## 2023-01-24 PROCEDURE — 82306 VITAMIN D 25 HYDROXY: CPT

## 2023-01-24 PROCEDURE — 36415 COLL VENOUS BLD VENIPUNCTURE: CPT

## 2023-01-24 PROCEDURE — 6370000000 HC RX 637 (ALT 250 FOR IP): Performed by: NURSE PRACTITIONER

## 2023-01-24 PROCEDURE — 85730 THROMBOPLASTIN TIME PARTIAL: CPT

## 2023-01-24 PROCEDURE — 84443 ASSAY THYROID STIM HORMONE: CPT

## 2023-01-24 PROCEDURE — 6370000000 HC RX 637 (ALT 250 FOR IP): Performed by: NEUROLOGICAL SURGERY

## 2023-01-24 PROCEDURE — 99024 POSTOP FOLLOW-UP VISIT: CPT | Performed by: NURSE PRACTITIONER

## 2023-01-24 PROCEDURE — 97165 OT EVAL LOW COMPLEX 30 MIN: CPT

## 2023-01-24 PROCEDURE — 85025 COMPLETE CBC W/AUTO DIFF WBC: CPT

## 2023-01-24 PROCEDURE — 97116 GAIT TRAINING THERAPY: CPT

## 2023-01-24 PROCEDURE — 83540 ASSAY OF IRON: CPT

## 2023-01-24 PROCEDURE — APPNB45 APP NON BILLABLE 31-45 MINUTES: Performed by: NURSE PRACTITIONER

## 2023-01-24 PROCEDURE — 6360000002 HC RX W HCPCS: Performed by: NURSE PRACTITIONER

## 2023-01-24 PROCEDURE — 85045 AUTOMATED RETICULOCYTE COUNT: CPT

## 2023-01-24 PROCEDURE — 85610 PROTHROMBIN TIME: CPT

## 2023-01-24 PROCEDURE — 6360000002 HC RX W HCPCS: Performed by: NEUROLOGICAL SURGERY

## 2023-01-24 PROCEDURE — 80053 COMPREHEN METABOLIC PANEL: CPT

## 2023-01-24 PROCEDURE — 97162 PT EVAL MOD COMPLEX 30 MIN: CPT

## 2023-01-24 PROCEDURE — 82728 ASSAY OF FERRITIN: CPT

## 2023-01-24 PROCEDURE — 97530 THERAPEUTIC ACTIVITIES: CPT

## 2023-01-24 PROCEDURE — 1200000000 HC SEMI PRIVATE

## 2023-01-24 PROCEDURE — 82607 VITAMIN B-12: CPT

## 2023-01-24 RX ORDER — OXYCODONE HYDROCHLORIDE 5 MG/1
5 TABLET ORAL
Status: DISCONTINUED | OUTPATIENT
Start: 2023-01-24 | End: 2023-01-25

## 2023-01-24 RX ORDER — OXYCODONE HYDROCHLORIDE 5 MG/1
5 TABLET ORAL EVERY 4 HOURS PRN
Status: DISCONTINUED | OUTPATIENT
Start: 2023-01-24 | End: 2023-01-24

## 2023-01-24 RX ORDER — OXYCODONE HYDROCHLORIDE 5 MG/1
10 TABLET ORAL EVERY 4 HOURS PRN
Status: DISCONTINUED | OUTPATIENT
Start: 2023-01-24 | End: 2023-01-24

## 2023-01-24 RX ORDER — OXYCODONE HYDROCHLORIDE 5 MG/1
10 TABLET ORAL
Status: DISCONTINUED | OUTPATIENT
Start: 2023-01-24 | End: 2023-01-25

## 2023-01-24 RX ORDER — ACETAMINOPHEN 500 MG
1000 TABLET ORAL EVERY 6 HOURS
Status: DISCONTINUED | OUTPATIENT
Start: 2023-01-24 | End: 2023-01-26 | Stop reason: HOSPADM

## 2023-01-24 RX ORDER — DEXTROAMPHETAMINE SACCHARATE, AMPHETAMINE ASPARTATE, DEXTROAMPHETAMINE SULFATE AND AMPHETAMINE SULFATE 1.25; 1.25; 1.25; 1.25 MG/1; MG/1; MG/1; MG/1
10 TABLET ORAL DAILY
Status: DISCONTINUED | OUTPATIENT
Start: 2023-01-24 | End: 2023-01-26 | Stop reason: HOSPADM

## 2023-01-24 RX ADMIN — OXYCODONE HYDROCHLORIDE 10 MG: 5 TABLET ORAL at 05:25

## 2023-01-24 RX ADMIN — ACETAMINOPHEN 1000 MG: 500 TABLET ORAL at 15:04

## 2023-01-24 RX ADMIN — DULOXETINE HYDROCHLORIDE 60 MG: 60 CAPSULE, DELAYED RELEASE ORAL at 08:33

## 2023-01-24 RX ADMIN — HYDROMORPHONE HYDROCHLORIDE 1 MG: 1 INJECTION, SOLUTION INTRAMUSCULAR; INTRAVENOUS; SUBCUTANEOUS at 02:22

## 2023-01-24 RX ADMIN — OXYCODONE HYDROCHLORIDE 10 MG: 5 TABLET ORAL at 23:36

## 2023-01-24 RX ADMIN — SENNOSIDES 17.2 MG: 8.6 TABLET, COATED ORAL at 08:32

## 2023-01-24 RX ADMIN — HYDROMORPHONE HYDROCHLORIDE 1 MG: 1 INJECTION, SOLUTION INTRAMUSCULAR; INTRAVENOUS; SUBCUTANEOUS at 18:34

## 2023-01-24 RX ADMIN — SODIUM CHLORIDE, PRESERVATIVE FREE 10 ML: 5 INJECTION INTRAVENOUS at 09:21

## 2023-01-24 RX ADMIN — OXYCODONE HYDROCHLORIDE 10 MG: 5 TABLET ORAL at 19:33

## 2023-01-24 RX ADMIN — ENOXAPARIN SODIUM 40 MG: 100 INJECTION SUBCUTANEOUS at 09:13

## 2023-01-24 RX ADMIN — METHOCARBAMOL 1000 MG: 100 INJECTION, SOLUTION INTRAMUSCULAR; INTRAVENOUS at 00:59

## 2023-01-24 RX ADMIN — POLYETHYLENE GLYCOL (3350) 17 G: 17 POWDER, FOR SOLUTION ORAL at 08:32

## 2023-01-24 RX ADMIN — HYDROMORPHONE HYDROCHLORIDE 1 MG: 1 INJECTION, SOLUTION INTRAMUSCULAR; INTRAVENOUS; SUBCUTANEOUS at 06:29

## 2023-01-24 RX ADMIN — ACETAMINOPHEN 1000 MG: 500 TABLET ORAL at 20:31

## 2023-01-24 RX ADMIN — LACOSAMIDE 50 MG: 50 TABLET, FILM COATED ORAL at 08:32

## 2023-01-24 RX ADMIN — OXYCODONE HYDROCHLORIDE 10 MG: 5 TABLET ORAL at 09:44

## 2023-01-24 RX ADMIN — ACETAMINOPHEN 1000 MG: 500 TABLET ORAL at 09:13

## 2023-01-24 RX ADMIN — FAMOTIDINE 20 MG: 20 TABLET, FILM COATED ORAL at 08:32

## 2023-01-24 RX ADMIN — HYDROMORPHONE HYDROCHLORIDE 2 MG: 1 INJECTION, SOLUTION INTRAMUSCULAR; INTRAVENOUS; SUBCUTANEOUS at 22:36

## 2023-01-24 RX ADMIN — HYDROMORPHONE HYDROCHLORIDE 1 MG: 1 INJECTION, SOLUTION INTRAMUSCULAR; INTRAVENOUS; SUBCUTANEOUS at 04:22

## 2023-01-24 RX ADMIN — SENNOSIDES 17.2 MG: 8.6 TABLET, COATED ORAL at 20:31

## 2023-01-24 RX ADMIN — OXYCODONE HYDROCHLORIDE 10 MG: 5 TABLET ORAL at 15:04

## 2023-01-24 RX ADMIN — DIAZEPAM 5 MG: 5 TABLET ORAL at 12:19

## 2023-01-24 RX ADMIN — HYDROMORPHONE HYDROCHLORIDE 1 MG: 1 INJECTION, SOLUTION INTRAMUSCULAR; INTRAVENOUS; SUBCUTANEOUS at 08:32

## 2023-01-24 RX ADMIN — LACOSAMIDE 50 MG: 50 TABLET, FILM COATED ORAL at 20:31

## 2023-01-24 RX ADMIN — DIAZEPAM 5 MG: 5 TABLET ORAL at 20:31

## 2023-01-24 RX ADMIN — GABAPENTIN 600 MG: 600 TABLET, FILM COATED ORAL at 08:33

## 2023-01-24 RX ADMIN — HYDROMORPHONE HYDROCHLORIDE 2 MG: 1 INJECTION, SOLUTION INTRAMUSCULAR; INTRAVENOUS; SUBCUTANEOUS at 20:33

## 2023-01-24 RX ADMIN — FAMOTIDINE 20 MG: 20 TABLET, FILM COATED ORAL at 20:31

## 2023-01-24 RX ADMIN — HYDROMORPHONE HYDROCHLORIDE 1 MG: 1 INJECTION, SOLUTION INTRAMUSCULAR; INTRAVENOUS; SUBCUTANEOUS at 10:53

## 2023-01-24 RX ADMIN — GABAPENTIN 600 MG: 600 TABLET, FILM COATED ORAL at 20:31

## 2023-01-24 RX ADMIN — SODIUM CHLORIDE, PRESERVATIVE FREE 10 ML: 5 INJECTION INTRAVENOUS at 20:37

## 2023-01-24 RX ADMIN — OXYCODONE HYDROCHLORIDE 10 MG: 5 TABLET ORAL at 00:56

## 2023-01-24 RX ADMIN — HYDROMORPHONE HYDROCHLORIDE 1 MG: 1 INJECTION, SOLUTION INTRAMUSCULAR; INTRAVENOUS; SUBCUTANEOUS at 13:15

## 2023-01-24 RX ADMIN — HYDROMORPHONE HYDROCHLORIDE 1 MG: 1 INJECTION, SOLUTION INTRAMUSCULAR; INTRAVENOUS; SUBCUTANEOUS at 16:21

## 2023-01-24 RX ADMIN — DIAZEPAM 5 MG: 5 TABLET ORAL at 05:25

## 2023-01-24 ASSESSMENT — PAIN DESCRIPTION - LOCATION
LOCATION: BACK

## 2023-01-24 ASSESSMENT — PAIN SCALES - GENERAL
PAINLEVEL_OUTOF10: 9
PAINLEVEL_OUTOF10: 9
PAINLEVEL_OUTOF10: 7
PAINLEVEL_OUTOF10: 8
PAINLEVEL_OUTOF10: 9
PAINLEVEL_OUTOF10: 8
PAINLEVEL_OUTOF10: 7
PAINLEVEL_OUTOF10: 8
PAINLEVEL_OUTOF10: 9
PAINLEVEL_OUTOF10: 8
PAINLEVEL_OUTOF10: 9
PAINLEVEL_OUTOF10: 7
PAINLEVEL_OUTOF10: 7
PAINLEVEL_OUTOF10: 9
PAINLEVEL_OUTOF10: 10
PAINLEVEL_OUTOF10: 6
PAINLEVEL_OUTOF10: 6
PAINLEVEL_OUTOF10: 9
PAINLEVEL_OUTOF10: 8
PAINLEVEL_OUTOF10: 8
PAINLEVEL_OUTOF10: 10

## 2023-01-24 ASSESSMENT — PAIN DESCRIPTION - DESCRIPTORS
DESCRIPTORS: ACHING

## 2023-01-24 ASSESSMENT — PAIN DESCRIPTION - PAIN TYPE
TYPE: SURGICAL PAIN

## 2023-01-24 ASSESSMENT — PAIN DESCRIPTION - ONSET
ONSET: ON-GOING

## 2023-01-24 ASSESSMENT — PAIN DESCRIPTION - FREQUENCY
FREQUENCY: CONTINUOUS

## 2023-01-24 ASSESSMENT — PAIN DESCRIPTION - ORIENTATION
ORIENTATION: MID
ORIENTATION: MID;RIGHT;LEFT
ORIENTATION: MID
ORIENTATION: LEFT;MID

## 2023-01-24 NOTE — PROGRESS NOTES
Pt is A&Ox4. Vitals stable on RA. Some relief of pain  with new pain med changes. Denies nausea and vomiting. Voiding adequately via blanchard. All fall precaution are in place. call light within a reach. bed is lowest position. Bed Alarm is on for safety. Will continue monitor . Kristy Casiano ...

## 2023-01-24 NOTE — PROGRESS NOTES
Occupational Therapy  Facility/Department: [facility] 5T ORTHO/NEURO  Occupational Therapy Initial Assessment    Name: Bob Flannery  : 1985  MRN: [id number]  Date of Service: 2023    Discharge Recommendations: Bob Flannery scored a 22/24 on the AM-PAC ADL Inpatient form. Current research shows that an AM-PAC score of 18 or greater is typically associated with a discharge to the patient's home setting. Based on the patient's AM-PAC score, and their current ADL deficits, it is recommended that the patient have 2-3 sessions per week of Occupational Therapy at d/c to increase the patient's independence. At this time, this patient demonstrates the endurance and safety to discharge home with (home vs OP services) and a follow up treatment frequency of 2-3x/wk. Please see assessment section for further patient specific details. If patient discharges prior to next session this note will serve as a discharge summary. Please see below for the latest assessment towards goals. OT Equipment Recommendations  Equipment Needed: No       Patient Diagnosis(es): There were no encounter diagnoses. Past Medical History:  has a past medical history of Anesthesia, Anxiety, Autoimmune disease ([address] Broken jaw ([address] Bronchiolitis, Chronic back pain, Chronic pain syndrome, H/O renal failure, History of kidney stones, IBS (irritable bowel syndrome), Kidney stones, Psychogenic spell, Pyelonephritis, Seizures ([address] Thalassemia minor, and Visceral hypersensitivity syndrome. Past Surgical History:  has a past surgical history that includes Cholecystectomy; other surgical history; Tonsillectomy; hernia repair; Lithotripsy; Upper gastrointestinal endoscopy (2014); cyst removal; and lumbar fusion (N/A, 2023). Treatment Diagnosis: impaired mobility, transfers, ADL      Assessment   Performance deficits / Impairments: Decreased functional mobility ; Decreased ADL status; Decreased endurance  Assessment: From home with spouse, admit for lumbar fusion, reporting difficulty with pain control. Pt completing mobility, transfers, ADLs with SBA to SPVN this date, no AD. Pt fairly steady on her feet. Refusing additional ADLs 2/2 pain. Pt would benefit from cont OT while in acute care and dc home with A. Krunal cont POC. Treatment Diagnosis: impaired mobility, transfers, ADL  Decision Making: Low Complexity  REQUIRES OT FOLLOW-UP: Yes  Activity Tolerance  Activity Tolerance: Patient limited by pain;Treatment limited secondary to agitation        Plan   Occupational Therapy Plan  Times Per Week: 5-7     Restrictions  Position Activity Restriction  Other position/activity restrictions: amb pt, up with assist    Subjective   General  Chart Reviewed: Yes  Additional Pertinent Hx: L4-5 ANTERIOR LUMBAR INTERBODY FUSION WITH PEDICLE SCREW FIXATION, POSSIBLE FACETECTOMY. She has had chronic back pain due to Lumbosacral spondylolysis which has failed medical management on account of which she was admitted for surgery. She has a history of seizures, thalassemia, as well as chronic pain syndrome  Referring Practitioner: [place]  Diagnosis: Lumbosacral spondylolysis  Subjective  Subjective: In bed agreeble to session with encouragement,  present, pt reporting 10/10 pain, however no visible signs of severe pain, sitting up EOB immediatly with no wincing or grimacing. Pt demo some anxiety and agitation requiring increased encouragement throughout.      Social/Functional History  Social/Functional History  Lives With: Spouse  Type of Home: House  Home Layout: Two level, Performs ADL's on one level, Able to Live on Main level with bedroom/bathroom  Home Access: Stairs to enter with rails  Entrance Stairs - Number of Steps: 10  Bathroom Shower/Tub: Tub/Shower unit  Bathroom Toilet: Standard  Bathroom Equipment:  (planning to get shower chair and grab bars at [employer])  Home Equipment:  (none)  Has the patient had two or more falls in the past year or any fall with injury in the past year?: No  ADL Assistance: Independent  Homemaking Assistance: Independent  Ambulation Assistance: Independent  Transfer Assistance: Independent  Active : No  Additional Comments: reporting broke back 4 years ago and has needed some assistance since then. Safety Devices  Type of Devices: Call light within reach;Nurse notified;Gait belt;Bed alarm in place; Left in bed  Bed Mobility Training  Bed Mobility Training: Yes  Supine to Sit: Supervision  Sit to Supine: Supervision  Balance  Sitting: Intact  Standing: Intact  Transfer Training  Transfer Training: Yes  Overall Level of Assistance: Stand-by assistance;Supervision  Sit to Stand: Stand-by assistance;Supervision  Stand to Sit: Stand-by assistance;Supervision  Toilet Transfer: Supervision  Gait  Overall Level of Assistance: Stand-by assistance;Supervision (short mobility in room, to and from bathroom)  Assistive Device: Gait belt (none)     AROM: Generally decreased, functional  Strength: Generally decreased, functional  ADL  Feeding: Independent  Grooming:  (declined)  Toileting: Supervision  Additional Comments: declined additional ADLs     Activity Tolerance  Activity Tolerance: Patient tolerated evaluation without incident;Patient limited by pain  Activity Tolerance Comments: severely limited by pain        Vision  Vision: Within Functional Limits  Hearing  Hearing: Within functional limits  Cognition  Overall Cognitive Status: WFL  Orientation  Overall Orientation Status: Within Functional Limits                  Education Given To: Patient; Family  Education Provided: Role of Therapy;Plan of Care;Transfer Training;ADL Adaptive Strategies  Education Method: Demonstration;Verbal  Barriers to Learning: None  Education Outcome: Verbalized understanding;Demonstrated understanding;Continued education needed                          AM-PAC Score        AM-PAC Inpatient Daily Activity Raw Score: 22 (01/24/23 1207)  AM-PAC Inpatient ADL T-Scale Score : 47.1 (01/24/23 1207)  ADL Inpatient CMS 0-100% Score: 25.8 (01/24/23 1207)  ADL Inpatient CMS G-Code Modifier : CJ (01/24/23 1207)        Goals  Short Term Goals  Time Frame for Short Term Goals: dc  Short Term Goal 1: Fx mobility with Bruna  Short Term Goal 2: supine to sit ith rBuna  Short Term Goal 3: Fx transfers with Bruna  Short Term Goal 4: LB dressing Bruna  Short Term Goal 5: groomin Bruna in stance       Therapy Time   Individual Concurrent Group Co-treatment   Time In 0915         Time Out 0939         Minutes 24             Timed Code Treatment Minutes:   9    Total Treatment Minutes:  [place], OT

## 2023-01-24 NOTE — PROGRESS NOTES
4 Eyes Admission Assessment     I agree as the admission nurse that 2 RN's have performed a thorough Head to Toe Skin Assessment on the patient. ALL assessment sites listed below have been assessed on admission. Areas assessed by both nurses: yes  [x]   Head, Face, and Ears   [x]   Shoulders, Back, and Chest  [x]   Arms, Elbows, and Hands   [x]   Coccyx, Sacrum, and Ischium  [x]   Legs, Feet, and Heels        Does the Patient have Skin Breakdown?   No         Flex Prevention initiated:  No   Wound Care Orders initiated:  No      [facility] nurse consulted for Pressure Injury (Stage 3,4, Unstageable, DTI, NWPT, and Complex wounds) or Flex score 18 or lower:  No      Nurse 1 eSignature: Electronically signed by Leia Madrigal RN on 1/23/23 at 7:10 PM EST    **SHARE this note so that the co-signing nurse is able to place an eSignature**    Nurse 2 eSignature: Electronically signed by Leyla Maria RN on 1/24/23 at 7:42 PM EST

## 2023-01-24 NOTE — ANESTHESIA POSTPROCEDURE EVALUATION
Department of Anesthesiology  Postprocedure Note    Patient: Di Serrano  MRN: [id number]  YOB: 1985  Date of evaluation: 1/23/2023      Procedure Summary     Date: 01/23/23 Room / Location: [facility]    Anesthesia Start: 1394 Anesthesia Stop: 1935    Procedure: L4-5 ANTERIOR LUMBAR INTERBODY FUSION WITH PEDICLE SCREW FIXATION, POSSIBLE FACETECTOMY Diagnosis:       Lumbosacral spondylolysis      (Lumbosacral spondylolysis [M43.07])    Surgeons: Katelin Frey MD Responsible Provider: Kamini Hylton MD    Anesthesia Type: general ASA Status: 2          Anesthesia Type: No value filed.     Marion Phase I: Marion Score: 9    Marion Phase II:        Anesthesia Post Evaluation    Patient location during evaluation: PACU  Patient participation: complete - patient participated  Level of consciousness: awake and alert  Pain score: 7  Airway patency: patent  Nausea & Vomiting: no nausea and no vomiting  Complications: no  Cardiovascular status: hemodynamically stable  Respiratory status: acceptable  Hydration status: euvolemic

## 2023-01-24 NOTE — PROGRESS NOTES
Pt is complaining pain 10 out of 10 not manageing with Narco and Dilaudid. Notified Dr. Teresita Fitzgerald and got order for oxycodone and changed frequency dilaudid to q2h. Verified  allergies with patient,per pt she have taken oxycodone before and No allergies reaction ok with changes . .. Gloria Fanning Gloria Fanning Gloria Fanning

## 2023-01-24 NOTE — PLAN OF CARE
Called to bedside by RN for pain control issues. Patient c/o 10/10 pain to low back and bilateral hips. Incision edges approximated, dressing shows slight drainage. Patient denies any numbness/tingling or weakness in BLE. Patient has received all scheduled and PRN muscle relaxers and pain meds. One time dose of 2mg dilaudid ordered. Patient expresses relief following administration of one time dose dilaudid. Discussed with patient's  the prospect of bringing in her home ketamine dose in the morning for additional pain control if it is needed.        TAYA Butler - CNP   Neurology & Neurocritical Care   1/23/2023 10:18 PM    ICU Patients:   Neurocritical Care Line: [phone]  Perfect[facility]erve: [facility] Neurocritical Care    Floor / PCU Patients:  Neurology Line: [phone]  [facility]: [facility] Neurology

## 2023-01-24 NOTE — CONSULTS
Hospital Medicine  Consult History & Physical        Chief Complaint:  Back pain    Date of Service: Pt seen/examined in consultation on 1/23/2023    History Of Present Illness:      42 y.o. female who we are asked to see/evaluate by Beto Mcneil MD for medical management. She has had chronic back pain due to Lumbosacral spondylolysis which has failed medical management on account of which she was admitted for surgery. She has a history of seizures, thalassemia, as well as chronic pain syndrome    She was in the OR 1/23/2023, for L4-5 ANTERIOR LUMBAR INTERBODY FUSION WITH PEDICLE SCREW FIXATION, POSSIBLE FACETECTOMY. It was under general anesthesia, with estimated blood loss of less than 100 cc per. Prior to and since surgery, she has not had chest pain, shortness of breath, dizziness or lightheadedness. Reviewed her medical history as well as vitals and her most recent labs. Past Medical History:        Diagnosis Date    Anesthesia     PATIENT STATES \" IT'S DIFFICULT TO PUT ME TO SLEEP\"    Anxiety     Autoimmune disease ([address]     Broken jaw ([address]     titanum plate    Bronchiolitis     Chronic back pain     Chronic pain syndrome     H/O renal failure 2013    left kidney    History of kidney stones     IBS (irritable bowel syndrome)     Kidney stones     Psychogenic spell 07/12/2014    reviewed UC note, they dx as nonepileptic seizures    Pyelonephritis     Seizures ([address]     last seizure one month ago. ...temporal lobe epilepsy    Thalassemia minor     Visceral hypersensitivity syndrome        Past Surgical History:        Procedure Laterality Date    CHOLECYSTECTOMY      CYST REMOVAL      HERNIA REPAIR      right inguinal    LITHOTRIPSY      OTHER SURGICAL HISTORY      titanum plate upper jaw d/t fracture    TONSILLECTOMY      UPPER GASTROINTESTINAL ENDOSCOPY  11/05/2014    biopsies       Medications Prior to Admission:    Prior to Admission medications    Medication Sig Start Date End Date Taking? Authorizing Provider   Ketamine HCl 100 MG TROC Take 25 mg by mouth 3 times daily    Historical Provider, MD   lacosamide (VIMPAT) 50 MG TABS tablet Take 1 tablet by mouth twice daily 5/27/22 1/23/23  Chris Moyer MD   gabapentin (NEURONTIN) 600 MG tablet Take 1 tablet by mouth twice daily 5/27/22 1/23/23  Chris Moyer MD   ondansetron (ZOFRAN-ODT) 8 MG TBDP disintegrating tablet Place 1 tablet under the tongue every 8 hours as needed for Nausea or Vomiting 4/6/22   Naz Saucer, APRN - CNP   traMADol (ULTRAM) 50 MG tablet TAKE 1 TABLET BY MOUTH THREE TIMES DAILY 3/11/22   Historical Provider, MD   DULoxetine (CYMBALTA) 60 MG capsule Take 60 mg by mouth daily    Historical Provider, MD       Allergies:  Flexeril [cyclobenzaprine]; Minocycline; Toradol [ketorolac tromethamine]; Antihistamines, chlorpheniramine-type; Benadryl [diphenhydramine]; Carbamazepine; Dextromethorphan; Divalproex sodium; Fosphenytoin; Hydroxyzine hcl; Lamotrigine; Levetiracetam; Morphine and related; Other; Oxcarbazepine; Oxycodone-acetaminophen; Reglan [metoclopramide]; Topiramate; Metronidazole; and Sudafed [pseudoephedrine hcl]    Social History:     TOBACCO:   reports that she quit smoking 8 days ago. Her smoking use included cigarettes. She has a 7.50 pack-year smoking history. She has never used smokeless tobacco.  ETOH:   reports no history of alcohol use. Family History:     Reviewed in detail and negative for DM, CAD, Cancer, CVA. Positive as follows:    History reviewed. No pertinent family history. REVIEW OF SYSTEMS COMPLETED:   Pertinent positives as noted in the HPI. All other systems reviewed and negative. PHYSICAL EXAM PERFORMED:  /84   Pulse 90   Temp 98 °F (36.7 °C) (Oral)   Resp 16   Ht 5' 6\" (1.676 m)   Wt 125 lb (56.7 kg)   LMP 01/04/2023   SpO2 100%   BMI 20.18 kg/m²   General appearance: No apparent distress, appears stated age and cooperative.   HEENT: Normal cephalic, atraumatic without obvious deformity. Pupils equal, round, and reactive to light. Extra ocular muscles intact. Conjunctivae/corneas clear. Neck: Supple, with full range of motion. No jugular venous distention. Trachea midline. Respiratory:  Normal respiratory effort. Clear to auscultation, bilaterally without Rales/Wheezes/Rhonchi. Cardiovascular: Regular rate and rhythm with normal S1/S2 without murmurs, rubs or gallops. Abdomen: Soft, non-tender, non-distended with normal bowel sounds. Musculoskeletal:  No clubbing, cyanosis or edema bilaterally. Skin: Skin color, texture, turgor normal.  No rashes or lesions. Neurologic:  Neurovascularly intact without any focal sensory/motor deficits. Cranial nerves: II-XII intact, grossly non-focal.  Psychiatric: Alert and oriented, thought content appropriate, normal insight  Capillary Refill: Brisk,3 seconds, normal   Peripheral Pulses: +2 palpable, equal bilaterally     Labs:     No results for input(s): WBC, HGB, HCT, PLT in the last 72 hours. No results for input(s): NA, K, CL, CO2, BUN, CREATININE, CALCIUM, PHOS in the last 72 hours. Invalid input(s): MAGNES  No results for input(s): AST, ALT, BILIDIR, BILITOT, ALKPHOS in the last 72 hours. No results for input(s): INR in the last 72 hours. No results for input(s): Shaina Dross in the last 72 hours. Urinalysis:    Lab Results   Component Value Date/Time    NITRU Negative 12/23/2021 12:15 PM    WBCUA 7 07/06/2015 12:25 PM    BACTERIA 2+ 07/06/2015 12:25 PM    RBCUA 1 07/06/2015 12:25 PM    BLOODU neg 03/24/2022 10:47 AM    BLOODU Negative 12/23/2021 12:15 PM    SPECGRAV 1.030 03/24/2022 10:47 AM    SPECGRAV 1.007 12/23/2021 12:15 PM    GLUCOSEU neg 03/24/2022 10:47 AM    GLUCOSEU Negative 12/23/2021 12:15 PM       Radiology: I have reviewed the radiology reports with the following interpretation:     XR LUMBAR SPINE (2-3 VIEWS)   Final Result      Intraoperative fluoroscopy was performed.  Correlate with procedural note for additional information. FLUORO FOR SURGICAL PROCEDURES   Final Result      Intraoperative fluoroscopy was performed. Correlate with procedural note for additional information. 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 Procedure was under general anesthesia, with estimated blood loss of less than 100 cc per. Pain management    Monitor postop labs    PT/OT eval    DVT prophylaxis      Seizures  Chronic pain syndrome: narcotic dependent  - Continue vimpat  - Continue home meds as appropriate  - Bowel meds      DVT Prophylaxis: Per Primary  Diet: ADULT DIET;  Regular  Code Status: Full Code    PT/OT Eval Status: Active and ongoing    Dispo -Per Primary    Thank you for the consultation, will follow up as needed    Electronically signed by Araceli Singh MD on 1/23/23 at 8:38 PM EST

## 2023-01-24 NOTE — PROGRESS NOTES
Hospitalist Progress Note      PCP: Lucien Rinne, DO    Date of Admission: 1/23/2023    Chief Complaint: back pain    Subjective:     She reports pain at incision wound sites on left abdomen and on both sides back. She developed new swelling on right half of tongue. She shortness of breath. She denies lightheadedness, chest pain, palpitations. She denies nausea, abdominal pain, dysuria. She has not have a bowel movement. Medications:  Reviewed    Infusion Medications    sodium chloride      sodium chloride 125 mL/hr at 01/23/23 2158     Scheduled Medications    acetaminophen  1,000 mg Oral Q6H    DULoxetine  60 mg Oral Daily    gabapentin  600 mg Oral BID    lacosamide  50 mg Oral BID    sodium chloride flush  5-40 mL IntraVENous 2 times per day    polyethylene glycol  17 g Oral Daily    senna  2 tablet Oral BID    famotidine  20 mg Oral BID    Or    famotidine (PEPCID) injection  20 mg IntraVENous BID    enoxaparin  40 mg SubCUTAneous Daily    lidocaine  1 patch TransDERmal Daily     PRN Meds: HYDROmorphone, oxyCODONE **OR** oxyCODONE, sodium chloride flush, sodium chloride, naloxone, diazePAM, ondansetron **OR** ondansetron      Intake/Output Summary (Last 24 hours) at 1/24/2023 0956  Last data filed at 1/24/2023 0630  Gross per 24 hour   Intake 1470 ml   Output 1550 ml   Net -80 ml       Physical Exam Performed:    /67   Pulse 65   Temp 97.8 °F (36.6 °C) (Oral)   Resp 16   Ht 5' 6\" (1.676 m)   Wt 125 lb (56.7 kg)   LMP 01/04/2023   SpO2 96%   BMI 20.18 kg/m²       GEN alert, in no distress  HEENT normocephalic, anicteric sclera, EOMI, mucosa moist, no stridor  NECK supple, trachea midline  RESP on RA, in no distress, clear to auscultation  CARDS RRR, S1, S2, no murmurs, no edema, radial pulse 2+, DP pulse 2+  ABD +BS, soft tenderness of left abdominal incision wound, dressing clean dry.   MSK incision wounds on left and right back, no cyanosis, no clubbing  SKIN warm, dry  NEURO alert, oriented x 3, no facial asymmetry, no dysarthria, moving spontaneously  PSYCH normal mood      Labs:   Recent Labs     01/24/23  0632   WBC 7.1   HGB 10.5*   HCT 32.6*        Recent Labs     01/24/23  0632      K 4.1      CO2 24   BUN 6*   CREATININE <0.5*   CALCIUM 8.5     Recent Labs     01/24/23  0632   AST 68*   ALT 45*   BILITOT 0.6   ALKPHOS 80     Recent Labs     01/24/23  0632   INR 1.10     No results for input(s): Cherlester Gash in the last 72 hours. Urinalysis:      Lab Results   Component Value Date/Time    NITRU Negative 12/23/2021 12:15 PM    WBCUA 7 07/06/2015 12:25 PM    BACTERIA 2+ 07/06/2015 12:25 PM    RBCUA 1 07/06/2015 12:25 PM    BLOODU neg 03/24/2022 10:47 AM    BLOODU Negative 12/23/2021 12:15 PM    SPECGRAV 1.030 03/24/2022 10:47 AM    SPECGRAV 1.007 12/23/2021 12:15 PM    GLUCOSEU neg 03/24/2022 10:47 AM    GLUCOSEU Negative 12/23/2021 12:15 PM       Radiology:  CT GUIDED STEREOTACTIC LOCALIZATION   Final Result   1. Intraoperative CT imaging localization during lumbar interbody fusion surgery at L4-L5 as described. XR LUMBAR SPINE (2-3 VIEWS)   Final Result      Intraoperative fluoroscopy was performed. Correlate with procedural note for additional information. FLUORO FOR SURGICAL PROCEDURES   Final Result      Intraoperative fluoroscopy was performed. Correlate with procedural note for additional information. Assessment/Plan: This is a 42 y.o. female who we are asked to see/evaluate by Shayna Rocha. Roberta Dinh MD for medical management. She has had chronic back pain due to Lumbosacral spondylolysis which has failed medical management on account of which she was admitted for surgery. She has a history of seizures, thalassemia, as well as chronic pain syndrome     She was in the OR 1/23/2023, for L4-5 ANTERIOR LUMBAR INTERBODY FUSION WITH PEDICLE SCREW FIXATION, POSSIBLE FACETECTOMY.      It was under general anesthesia, with estimated blood loss of less than 100 cc per. Prior to and since surgery, she has not had chest pain, shortness of breath, dizziness or lightheadedness. Active Hospital Problems    Diagnosis Date Noted    Lumbar stenosis with neurogenic claudication [M48.062] 01/23/2023     Priority: Medium     S/p L4-5 Anterior Lumbar Interbody Fusion With Pedicle Screw Fixation, Possible Facetectomy on 1/23/23  - for lumbar spondylosis. - Pain control with bowel regimen  - Monitor wounds for signs of infection.  - PT/OT. Microcytic Anemia  Thalassemia Trait  - adm HgB 10.5, MCV 71.4. Baseline HgB 11''s-12's. - anemia work-up. Seizures  - partial epilepsy with impairment in consciousness.  - Continue vimpat 50 mg BID. Chronic pain syndrome  - narcotic dependent  - consider baseline pain, treat acute pain adequately. Generalized Anxiety   ADHD  - Continue home cymbalta 60 mg daily, gabapentin 600 mg BID. DVT Prophylaxis: SCD  Diet: ADULT DIET;  Regular  Code Status: Full Code    PT/OT Eval Status: PT/OT    Dispo - when stable    Margie Ledbetter MD

## 2023-01-24 NOTE — CARE COORDINATION
CM Note:  CM met with pt and  at bedside and conducted a chart review. Pt is from home with  with minimal occassional need for assist with ADL's due to previous back fracture. Pt and  plan for pt to return home at discharge,  to transport. Pt and  report no HHC, DME or CM needs at discharge. Pt identifies that her plan is to utilize rest, relaxation and ice packs at home. No other needs identified. Pt is not at high risk for readmission, has not recently been admitted, and there is no active consult for Case Management services.  will sign off on this pt at this time. If needs arise, please consult Case Management services.     Risk of Readmission Score 8%  Electronically signed by NASIMA Mohamud on 1/24/2023 at 3:15 PM  [phone]

## 2023-01-24 NOTE — PROGRESS NOTES
NEUROSURGERY POST-OP PROGRESS NOTE    Patient Name: Iain Trevino YOB: 1985   Sex: Female Age: 42 yrs     Medical Record Number: [id number] Acct Number: [de-identified]   Room Number: 5445/8991-28 Hospital Day: Hospital Day: 2     Interval History:  Post-operative Day# 1 s/p L4-5 ANTERIOR LUMBAR INTERBODY FUSION WITH PEDICLE SCREW FIXATION, POSSIBLE FACETECTOMY with Dr. Simental Spruce: Patient resting in bed, she complains of severe incisional pain and pain in her left hip. Some relief with changes to pain medications overnight. Objective:    VITAL SIGNS   /73   Pulse 76   Temp 97.8 °F (36.6 °C) (Oral)   Resp 16   Ht 5' 6\" (1.676 m)   Wt 125 lb (56.7 kg)   LMP 01/04/2023   SpO2 96%   BMI 20.18 kg/m²    Height Height: 5' 6\" (167.6 cm)   Weight Weight: 125 lb (56.7 kg)        Allergies Allergies   Allergen Reactions    Flexeril [Cyclobenzaprine]      Altered mental status    Minocycline Anaphylaxis    Toradol [Ketorolac Tromethamine]      Passes out    Antihistamines, Chlorpheniramine-Type Other (See Comments)     Pt states she passes out    Benadryl [Diphenhydramine] Other (See Comments)     Black out    Carbamazepine Other (See Comments)     HYPOTENSION    Dextromethorphan Other (See Comments)     PASSED OUT    Divalproex Sodium Other (See Comments)     HYPOTENSION    Fosphenytoin Other (See Comments)     HYPOTENSION    Hydroxyzine Hcl Other (See Comments)     HYPOTENSION    Lamotrigine Other (See Comments)     HYPOTENSION    Levetiracetam Hives    Morphine And Related Other (See Comments)     CHEST PRESSURE    Other      All seizure meds, except gabapentin. Different reactions with each med  All seizure meds, except gabapentin.   Different reactions with each med      Oxcarbazepine Other (See Comments)     AGITATION    Oxycodone-Acetaminophen Other (See Comments)     Aggressiveness     Okay with vicodin dilaudid demerol  Aggressiveness     Okay with vicodin dilaudid demerol  Agressiveness; Okay with Vicodin, Dilaudid, Demerol  Does not like      Reglan [Metoclopramide]      Passes out    Topiramate Other (See Comments)     MIGRANE    Metronidazole Rash     Seizures. Sudafed [Pseudoephedrine Hcl] Anxiety      NPO Status ADULT DIET; Regular   Isolation No active isolations     LABS   Basic Metabolic Profile No results for input(s): NA, POTASSIUM, CL, CO2, BUN, CREATININE, GLUCOSE, CA, ALB, PHOS, MG in the last 72 hours. Complete Blood Count No results for input(s): WBC, RBC, HEMOGLOBIN in the last 72 hours. Invalid input(s): HEMATOCRIT   Coagulation Studies No results for input(s): PTT, INR in the last 72 hours. Invalid input(s): PLATELETS, PROA, PT, PTTA     MEDICATIONS   Inpatient Medications     DULoxetine, 60 mg, Oral, Daily    gabapentin, 600 mg, Oral, BID    lacosamide, 50 mg, Oral, BID    sodium chloride flush, 5-40 mL, IntraVENous, 2 times per day    polyethylene glycol, 17 g, Oral, Daily    senna, 2 tablet, Oral, BID    famotidine, 20 mg, Oral, BID **OR** famotidine (PEPCID) injection, 20 mg, IntraVENous, BID    enoxaparin, 40 mg, SubCUTAneous, Daily    methocarbamol IVPB, 1,000 mg, IntraVENous, Q8H **FOLLOWED BY** methocarbamol, 750 mg, Oral, 4 times per day    lidocaine, 1 patch, TransDERmal, Daily   Infusions    sodium chloride      sodium chloride 125 mL/hr at 01/23/23 2158      Antibiotics   Recent Abx Admin                     ceFAZolin (ANCEF) injection (g) 2 g Given 01/23/23 1318    ceFAZolin (ANCEF) 1,000 mg in sodium chloride 0.9 % 1,000 mL (mL) 1,000 mL Given 01/23/23 1307                     Neurologic Exam:    Mental status: awake/alert, oriented x 4    Musculoskeletal:   Gait: Not tested   Tone: normal   Sensory: intact to light touch   Motor strength:    Right  Left    Right  Left    Deltoid  5 5  Hip Flex  5 5   Biceps  5 5  Knee Extensors  5 5   Triceps  5 5  Knee Flexors  5 5   Wrist Ext  5 5  Ankle Dorsiflex.   5 5   Wrist Flex  5 5  Ankle Plantarflex. 5 5   Handgrip  5 5  Ext Esau Longus  5 5   Thumb Ext  5 5         Incision: dressings c/d/i    Respiratory:  Unlabored respiratory pattern    Abdomen:   Soft, ND   Last BM preop  Tenderness over incision     Cardiovascular:  Warm, well perfused    Assessment   43 yo female POD 1 s/p L4-5 ANTERIOR LUMBAR INTERBODY FUSION WITH PEDICLE SCREW FIXATION, POSSIBLE FACETECTOMY with Dr. Mundo Mckenzie:  Neurologic exam frequency: q 4   Mobility: PT/OT, activity as tolerated   Diet: ADAT  M[place]ina: remove   DVT Prophylaxis: SCDs and SQ lovenox  GI Prophylaxis: pepcid   Bowel Regimen: senokot, glycolax   Pain control: tylenol, dilaudid, roxicodone, lidocaine patch   Robaxin/valium for muscle spasms  Pharmacy consult for assistance w/ pain control   Incisional Care: cleanse daily with soap/water, pat dry with clean towel   Dispo Planning: continue care on 5T    Patient was discussed with Dr. Mckenzie Eid who agrees with above assessment and plan. Electronically signed by:  TAYA Serrano NP, 1/24/2023 7:32 AM   Neurosurgery Nurse Practitioner  [phone]

## 2023-01-24 NOTE — CONSULTS
Clinical Pharmacy Progress Note    Admit date: 1/23/2023     Subjective/Objective:  Pt is a 36yof with a PMHx that includes seizure, chronic back pain who is admitted for L4-5 anterior lumbar interbody fusion with screw fixation. Pharmacy has been consulted to make pain medication recommendations by TAYA Phillips    Home pain regimen:  Ketamine 25 mg TID (confirmed using for pain)  Gabapentin 600  mg BID  Tramadol 50 mg TID    1/24: Patient reports pain scores of 8-9 today. She states that her current pain medication regimen relieves pain down to ~5 however it only lasts for about half of the interval ordered. Patient is very reasonable with her acute post-op pain management expectations and understands the surgery she had was painful and that she is in acute pain. She understands to ask for her PRN pain meds when they are due, and has been doing such. Patient was able to walk with assistance to bathroom/chair. Discussed with patient/ about bringing in home ketamine tablets. They will be discussing this with medical team.    Current pain regimen:   Medication  Home med? Amount used last 24 hrs    Acetaminophen 1000 mg q6h No 1000 mg (1 dose)   Diazepam 5 mg q6h PRN No 10 mg (2 dose)   Duloxetine 60 mg daily Yes 60 mg (1 dose)   Gabapentin 600 mg BID Yes 1200 mg (2 dose)   Hydromorphone 1mg IV q2h PRN severe pain No 4 mg (4 dose)   Lidocaine patch daily No 1 patch   Oxycodone 5-10 mg PO q4h PRN No 30 mg (10 mg x3 dose)            Assessment/Plan:  1)  Pain regimen recommendations:  Recommend increasing hydromorphone to 1 mg q2h PRN moderate pain and 1.5 mg q2h PRN severe pain. Can also consider reducing interval of oxycodone to q3h PRN. Patient reports moderate pain improvement with current doses, however reports that the pain returns about MCFP through the dosing interval.   Patient denies need for sleep aids. She states that if pain is under control then she will sleep.  Will not recommend sleep aids at this time. Patient will discuss with team/ the possibility of bringing in home ketamine. Will notify TAYA Ruiz of above recommendations. Will continue to monitor and assist with adjustments to regimen as needed. Please call with any questions.   Christiano AveryD, BCPS  Wireless: [id number]  Main pharmacy: [id number]  1/24/2023 9:23 AM

## 2023-01-24 NOTE — PROGRESS NOTES
Hospitalist Progress Note      Name:  Dilcia Lesser /Age/Sex: 1985  (42 y.o. female)   MRN & CSN:  [id number] & [id number] Admission Date/Time: 2023 11:08 AM   [facility]:  [facility] PCP: Alfie Velez Day: 2    Assessment and Plan:     42 y.o. female who we are asked to see/evaluate by Nicki Puentes. Frankey Lutes, MD for medical management. She has had chronic back pain due to Lumbosacral spondylolysis which has failed medical management on account of which she was admitted for surgery. 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    s/p L4-5 ANTERIOR LUMBAR INTERBODY FUSION WITH PEDICLE SCREW FIXATION, POSSIBLE FACETECTOMY on    Management per primary  Pain management   Bowel regimen  Dvt PPX  Pt/ot  Monitor HB postoperatively       Seizures  Chronic pain syndrome: narcotic dependent  - Continue vimpat  - Continue home meds as appropriate  - Bowel meds    Dispo -Per Primary     Thank you for the consultation, will follow up as needed       Diet ADULT DIET; Regular   DVT Prophylaxis [x] Lovenox, []  Heparin, [] SCDs, [] Ambulation   GI Prophylaxis [] PPI,  [x] H2 Blocker,  [] Carafate,  [] Diet/Tube Feeds   Code Status Full Code   Disposition Patient requires continued admission due to    MDM [] Low, [] Moderate,[]  High  Patient's risk as above due to      History of Present Illness: The patient was seen and examined  No BM yet since  surgery   Denies CP or SOB   Objective: Intake/Output Summary (Last 24 hours) at 2023 1039  Last data filed at 2023 0630  Gross per 24 hour   Intake 1470 ml   Output 1550 ml   Net -80 ml      Vitals:   Vitals:    23 0944   BP:    Pulse:    Resp: 16   Temp:    SpO2:      Physical Exam:   GEN Awake.  Alert , not in respiratory distress, not in pain  HEENT: PEERLA, , supple neck,   Chest: air entry equal bilaterally, no wheezing or crepitation  Heart: S1 and S2 heard, no murmur, no gallop or rub, regular rate  Abdomen: soft, ND , Nt, +BS  Extremities: no cyanosis, tenderness or erythema, peripheral pulses audible  Neurology: alert, oriented x3, able to move 4 limbs    Medications:   Medications:    acetaminophen  1,000 mg Oral Q6H    DULoxetine  60 mg Oral Daily    gabapentin  600 mg Oral BID    lacosamide  50 mg Oral BID    sodium chloride flush  5-40 mL IntraVENous 2 times per day    polyethylene glycol  17 g Oral Daily    senna  2 tablet Oral BID    famotidine  20 mg Oral BID    Or    famotidine (PEPCID) injection  20 mg IntraVENous BID    enoxaparin  40 mg SubCUTAneous Daily    lidocaine  1 patch TransDERmal Daily      Infusions:    sodium chloride      sodium chloride 125 mL/hr at 01/23/23 2158     PRN Meds: HYDROmorphone, 1 mg, Q2H PRN  oxyCODONE, 5 mg, Q4H PRN   Or  oxyCODONE, 10 mg, Q4H PRN  sodium chloride flush, 5-40 mL, PRN  sodium chloride, , PRN  naloxone, 0.2 mg, PRN  diazePAM, 5 mg, Q6H PRN  ondansetron, 4 mg, Q8H PRN   Or  ondansetron, 4 mg, Q6H PRN          Electronically signed by Janae Lockett MD on 1/24/2023 at 10:39 AM

## 2023-01-24 NOTE — PROGRESS NOTES
Physical Therapy  Facility/Department: [place] [id number]  Physical Therapy Initial Assessment    Name: Joey Pascual  : 1985  MRN: [id number]  Date of Service: 2023    Discharge Recommendations: .Joey Pascual scored a 20/24 on the AM-PAC short mobility form. Current research shows that an AM-PAC score of 18 or greater is typically associated with a discharge to the patient's home setting. Please see assessment section for further patient specific details. If patient discharges prior to next session this note will serve as a discharge summary. Please see below for the latest assessment towards goals. PT Equipment Recommendations  Equipment Needed: No      Patient Diagnosis(es): There were no encounter diagnoses. Past Medical History:  has a past medical history of Anesthesia, Anxiety, Autoimmune disease ([address] Broken jaw ([address] Bronchiolitis, Chronic back pain, Chronic pain syndrome, H/O renal failure, History of kidney stones, IBS (irritable bowel syndrome), Kidney stones, Psychogenic spell, Pyelonephritis, Seizures ([address] Thalassemia minor, and Visceral hypersensitivity syndrome. Past Surgical History:  has a past surgical history that includes Cholecystectomy; other surgical history; Tonsillectomy; hernia repair; Lithotripsy; Upper gastrointestinal endoscopy (2014); cyst removal; and lumbar fusion (N/A, 2023). Assessment   Body Structures, Functions, Activity Limitations Requiring Skilled Therapeutic Intervention: Decreased functional mobility ; Decreased safe awareness;Decreased balance;Decreased endurance; Increased pain;Decreased strength  Assessment: pt is a 43 yo female s/p L4-5 ANTERIOR LUMBAR INTERBODY FUSION WITH PEDICLE SCREW FIXATION, POSSIBLE FACETECTOMY. pt IND prior to admission with mobility, occasionally needs assist with ADLs since back fracture 4 years prior. pt severely limited by pain on this date and tearful throughout session.  however pt demonstrates steady gait with no AD and performs all mobility with SBA- supervision. pt with no concerns for returning home at [place] and denies need for AD. Anticipate pt will progress well with further pain control. Acute PT to follow up, recommend initial 24hr at [place]. Treatment Diagnosis: dec functional mobility  Therapy Prognosis: Good  Decision Making: Medium Complexity  Requires PT Follow-Up: Yes  Activity Tolerance  Activity Tolerance: Patient tolerated evaluation without incident;Patient limited by pain  Activity Tolerance Comments: severely limited by pain     Plan   Physcial Therapy Plan  General Plan: 5-7 times per week  Current Treatment Recommendations: Strengthening, Functional mobility training, Transfer training, Balance training, Endurance training, Gait training, Stair training, Safety education & training, Home exercise program, Patient/Caregiver education & training, Therapeutic activities, Neuromuscular re-education  Safety Devices  Type of Devices: Call light within reach, Nurse notified, Gait belt, Bed alarm in place, Left in bed     Restrictions  Position Activity Restriction  Other position/activity restrictions: amb pt, up with assist     Subjective   General  Chart Reviewed: Yes  Additional Pertinent Hx: pt is a 43 yo female s/p L4-5 ANTERIOR LUMBAR INTERBODY FUSION WITH PEDICLE SCREW FIXATION, POSSIBLE FACETECTOMY  Referring Practitioner: April Hough MD  Diagnosis: lumbar stenosis with neurogenic claudication  Follows Commands: Within Functional Limits  Subjective  Subjective: pt supine in bed and agreeable to PT. tearful and agitated throughout session 2/2 10/10 pain and frustration with her stay.  ice applied at end of session, pt declines further needs         Social/Functional History  Social/Functional History  Lives With: Spouse  Type of Home: House  Home Layout: Two level, Performs ADL's on one level, Able to Live on Main level with bedroom/bathroom  Home Access: Stairs to enter with rails  Entrance Stairs - Number of Steps: 10  Bathroom Shower/Tub: Tub/Shower unit  Bathroom Toilet: Standard  Bathroom Equipment:  (planning to get shower chair and grab bars at [facility])  Home Equipment:  (none)  Has the patient had two or more falls in the past year or any fall with injury in the past year?: No  ADL Assistance: Independent  Homemaking Assistance: Independent  Ambulation Assistance: Independent  Transfer Assistance: Independent  Active : No  Additional Comments: reporting broke back 4 years ago and has needed some assistance since then.   Vision/Hearing       Cognition   Orientation  Overall Orientation Status: Within Functional Limits  Cognition  Overall Cognitive Status: WFL     Objective   Heart Rate: 64  Heart Rate Source: Monitor  BP: 115/63  BP Location: Left upper arm  BP Method: Automatic  Patient Position: Right side  MAP (Calculated): 80  Resp: 16  SpO2: 96 %  O2 Device: None (Room air)                             Bed mobility  Supine to Sit: Supervision  Sit to Supine: Supervision  Scooting: Supervision  Bed Mobility Comments: via log roll  Transfers  Sit to Stand: Stand by assistance (from EOB, recliner, and toilet with no AD)  Stand to Sit: Stand by assistance  Ambulation  Surface: Level tile  Device: No Device  Assistance: Stand by assistance  Quality of Gait: steady gait, slow and guarded, limited by pain  Gait Deviations: Slow Kerri;Decreased step length  Distance: 10' + 15'x2  Comments: steady, no LOB     Balance  Posture: Good  Sitting - Static: Good  Sitting - Dynamic: Good  Standing - Static: Fair;+ (SBA at sink with no AD)  Standing - Dynamic: Fair           OutComes Score                                                  AM-PAC Score  AM-PAC Inpatient Mobility Raw Score : 20 (01/24/23 1048)  AM-PAC Inpatient T-Scale Score : 47.67 (01/24/23 1048)  Mobility Inpatient CMS 0-100% Score: 35.83 (01/24/23 1048)  Mobility Inpatient CMS G-Code Modifier : CJ (01/24/23 1048) Tinneti Score       Goals  Short Term Goals  Time Frame for Short Term Goals: by dc  Short Term Goal 1: pt will perform bed mobility with mod I  Short Term Goal 2: pt will perform functional transfers mod I  Short Term Goal 3: pt will ambulate 150' with mod I  Short Term Goal 4: pt will negotiate 10 steps with 1 HR and mod I  Patient Goals   Patient Goals : to go home       Education  Patient Education  Education Given To: Patient  Education Provided: Role of Therapy;Plan of Care;Precautions  Education Method: Demonstration;Verbal  Barriers to Learning: None  Education Outcome: Verbalized understanding      Therapy Time   Individual Concurrent Group Co-treatment   Time In 0918         Time Out 0942         Minutes 24             Timed Code Treatment Minutes:  9 min     Total Treatment Minutes:  24 min       Shazia Prather PT

## 2023-01-25 PROBLEM — Z98.1 S/P LUMBAR FUSION: Status: ACTIVE | Noted: 2023-01-25

## 2023-01-25 LAB
ANION GAP SERPL CALCULATED.3IONS-SCNC: 7 MMOL/L (ref 3–16)
BASOPHILS ABSOLUTE: 0 K/UL (ref 0–0.2)
BASOPHILS RELATIVE PERCENT: 0.5 %
BUN BLDV-MCNC: 8 MG/DL (ref 7–20)
CALCIUM SERPL-MCNC: 8.7 MG/DL (ref 8.3–10.6)
CHLORIDE BLD-SCNC: 103 MMOL/L (ref 99–110)
CO2: 29 MMOL/L (ref 21–32)
CREAT SERPL-MCNC: 0.6 MG/DL (ref 0.6–1.1)
EOSINOPHILS ABSOLUTE: 0.1 K/UL (ref 0–0.6)
EOSINOPHILS RELATIVE PERCENT: 1.7 %
GFR SERPL CREATININE-BSD FRML MDRD: >60 ML/MIN/{1.73_M2}
GLUCOSE BLD-MCNC: 105 MG/DL (ref 70–99)
HCT VFR BLD CALC: 33.1 % (ref 36–48)
HEMOGLOBIN: 10.5 G/DL (ref 12–16)
LYMPHOCYTES ABSOLUTE: 1.1 K/UL (ref 1–5.1)
LYMPHOCYTES RELATIVE PERCENT: 13.1 %
MCH RBC QN AUTO: 22.3 PG (ref 26–34)
MCHC RBC AUTO-ENTMCNC: 31.6 G/DL (ref 31–36)
MCV RBC AUTO: 70.4 FL (ref 80–100)
MONOCYTES ABSOLUTE: 1 K/UL (ref 0–1.3)
MONOCYTES RELATIVE PERCENT: 11.9 %
NEUTROPHILS ABSOLUTE: 6 K/UL (ref 1.7–7.7)
NEUTROPHILS RELATIVE PERCENT: 72.8 %
PDW BLD-RTO: 19.2 % (ref 12.4–15.4)
PLATELET # BLD: 210 K/UL (ref 135–450)
PMV BLD AUTO: 8.2 FL (ref 5–10.5)
POTASSIUM SERPL-SCNC: 3.4 MMOL/L (ref 3.5–5.1)
RBC # BLD: 4.7 M/UL (ref 4–5.2)
SODIUM BLD-SCNC: 139 MMOL/L (ref 136–145)
WBC # BLD: 8.2 K/UL (ref 4–11)

## 2023-01-25 PROCEDURE — 97530 THERAPEUTIC ACTIVITIES: CPT

## 2023-01-25 PROCEDURE — 1200000000 HC SEMI PRIVATE

## 2023-01-25 PROCEDURE — 6360000002 HC RX W HCPCS: Performed by: ANESTHESIOLOGY

## 2023-01-25 PROCEDURE — 2580000003 HC RX 258: Performed by: ANESTHESIOLOGY

## 2023-01-25 PROCEDURE — 6370000000 HC RX 637 (ALT 250 FOR IP): Performed by: INTERNAL MEDICINE

## 2023-01-25 PROCEDURE — 2580000003 HC RX 258: Performed by: NURSE PRACTITIONER

## 2023-01-25 PROCEDURE — 36415 COLL VENOUS BLD VENIPUNCTURE: CPT

## 2023-01-25 PROCEDURE — 6360000002 HC RX W HCPCS: Performed by: NURSE PRACTITIONER

## 2023-01-25 PROCEDURE — 94761 N-INVAS EAR/PLS OXIMETRY MLT: CPT

## 2023-01-25 PROCEDURE — 6360000002 HC RX W HCPCS: Performed by: NEUROLOGICAL SURGERY

## 2023-01-25 PROCEDURE — APPNB60 APP NON BILLABLE TIME 46-60 MINS: Performed by: NURSE PRACTITIONER

## 2023-01-25 PROCEDURE — 97116 GAIT TRAINING THERAPY: CPT

## 2023-01-25 PROCEDURE — 6370000000 HC RX 637 (ALT 250 FOR IP): Performed by: NURSE PRACTITIONER

## 2023-01-25 PROCEDURE — 80048 BASIC METABOLIC PNL TOTAL CA: CPT

## 2023-01-25 PROCEDURE — 85025 COMPLETE CBC W/AUTO DIFF WBC: CPT

## 2023-01-25 PROCEDURE — 99024 POSTOP FOLLOW-UP VISIT: CPT | Performed by: NURSE PRACTITIONER

## 2023-01-25 RX ORDER — FOLIC ACID 1 MG/1
1 TABLET ORAL DAILY
Qty: 30 TABLET | Refills: 0 | Status: SHIPPED | OUTPATIENT
Start: 2023-01-25

## 2023-01-25 RX ORDER — LANOLIN ALCOHOL/MO/W.PET/CERES
1000 CREAM (GRAM) TOPICAL DAILY
Status: DISCONTINUED | OUTPATIENT
Start: 2023-01-25 | End: 2023-01-26 | Stop reason: HOSPADM

## 2023-01-25 RX ORDER — POLYETHYLENE GLYCOL 3350 17 G/17G
17 POWDER, FOR SOLUTION ORAL DAILY PRN
Status: DISCONTINUED | OUTPATIENT
Start: 2023-01-25 | End: 2023-01-26 | Stop reason: HOSPADM

## 2023-01-25 RX ORDER — OXYCODONE HYDROCHLORIDE 10 MG/1
10-15 TABLET ORAL
Qty: 42 TABLET | Refills: 0 | Status: SHIPPED | OUTPATIENT
Start: 2023-01-25 | End: 2023-02-01

## 2023-01-25 RX ORDER — DIAZEPAM 5 MG/1
5 TABLET ORAL EVERY 6 HOURS PRN
Qty: 40 TABLET | Refills: 0 | Status: SHIPPED | OUTPATIENT
Start: 2023-01-25 | End: 2023-02-04

## 2023-01-25 RX ORDER — DOCUSATE SODIUM 100 MG/1
100 CAPSULE, LIQUID FILLED ORAL 2 TIMES DAILY
Status: DISCONTINUED | OUTPATIENT
Start: 2023-01-25 | End: 2023-01-26 | Stop reason: HOSPADM

## 2023-01-25 RX ORDER — FOLIC ACID 1 MG/1
1 TABLET ORAL DAILY
Status: DISCONTINUED | OUTPATIENT
Start: 2023-01-25 | End: 2023-01-26 | Stop reason: HOSPADM

## 2023-01-25 RX ORDER — SENNA PLUS 8.6 MG/1
2 TABLET ORAL 2 TIMES DAILY
Qty: 40 TABLET | Refills: 0 | Status: SHIPPED | OUTPATIENT
Start: 2023-01-25 | End: 2023-02-04

## 2023-01-25 RX ORDER — POLYETHYLENE GLYCOL 3350 17 G/17G
17 POWDER, FOR SOLUTION ORAL DAILY
Qty: 527 G | Refills: 1 | Status: SHIPPED | OUTPATIENT
Start: 2023-01-26 | End: 2023-02-25

## 2023-01-25 RX ORDER — OXYCODONE HYDROCHLORIDE 5 MG/1
10 TABLET ORAL
Status: DISCONTINUED | OUTPATIENT
Start: 2023-01-25 | End: 2023-01-26 | Stop reason: HOSPADM

## 2023-01-25 RX ORDER — NALOXONE HYDROCHLORIDE 0.4 MG/ML
INJECTION, SOLUTION INTRAMUSCULAR; INTRAVENOUS; SUBCUTANEOUS PRN
Status: DISCONTINUED | OUTPATIENT
Start: 2023-01-25 | End: 2023-01-26 | Stop reason: HOSPADM

## 2023-01-25 RX ORDER — OXYCODONE HYDROCHLORIDE 15 MG/1
15 TABLET ORAL
Status: DISCONTINUED | OUTPATIENT
Start: 2023-01-25 | End: 2023-01-26 | Stop reason: HOSPADM

## 2023-01-25 RX ADMIN — LACOSAMIDE 50 MG: 50 TABLET, FILM COATED ORAL at 21:34

## 2023-01-25 RX ADMIN — SENNOSIDES 17.2 MG: 8.6 TABLET, COATED ORAL at 08:07

## 2023-01-25 RX ADMIN — ACETAMINOPHEN 1000 MG: 500 TABLET ORAL at 15:14

## 2023-01-25 RX ADMIN — HYDROMORPHONE HYDROCHLORIDE 2 MG: 1 INJECTION, SOLUTION INTRAMUSCULAR; INTRAVENOUS; SUBCUTANEOUS at 08:00

## 2023-01-25 RX ADMIN — DOCUSATE SODIUM 100 MG: 100 CAPSULE, LIQUID FILLED ORAL at 21:35

## 2023-01-25 RX ADMIN — GABAPENTIN 600 MG: 600 TABLET, FILM COATED ORAL at 08:08

## 2023-01-25 RX ADMIN — HYDROMORPHONE HYDROCHLORIDE: 10 INJECTION, SOLUTION INTRAMUSCULAR; INTRAVENOUS; SUBCUTANEOUS at 17:12

## 2023-01-25 RX ADMIN — Medication 5000 UNITS: at 12:27

## 2023-01-25 RX ADMIN — GABAPENTIN 600 MG: 600 TABLET, FILM COATED ORAL at 21:34

## 2023-01-25 RX ADMIN — HYDROMORPHONE HYDROCHLORIDE 2 MG: 1 INJECTION, SOLUTION INTRAMUSCULAR; INTRAVENOUS; SUBCUTANEOUS at 00:33

## 2023-01-25 RX ADMIN — ACETAMINOPHEN 1000 MG: 500 TABLET ORAL at 21:34

## 2023-01-25 RX ADMIN — HYDROMORPHONE HYDROCHLORIDE: 10 INJECTION, SOLUTION INTRAMUSCULAR; INTRAVENOUS; SUBCUTANEOUS at 12:08

## 2023-01-25 RX ADMIN — OXYCODONE HYDROCHLORIDE 15 MG: 15 TABLET ORAL at 18:54

## 2023-01-25 RX ADMIN — LACOSAMIDE 50 MG: 50 TABLET, FILM COATED ORAL at 08:08

## 2023-01-25 RX ADMIN — DOCUSATE SODIUM 100 MG: 100 CAPSULE, LIQUID FILLED ORAL at 15:14

## 2023-01-25 RX ADMIN — FAMOTIDINE 20 MG: 20 TABLET, FILM COATED ORAL at 21:34

## 2023-01-25 RX ADMIN — CYANOCOBALAMIN TAB 1000 MCG 1000 MCG: 1000 TAB at 12:27

## 2023-01-25 RX ADMIN — OXYCODONE HYDROCHLORIDE 10 MG: 5 TABLET ORAL at 03:51

## 2023-01-25 RX ADMIN — ONDANSETRON 4 MG: 4 TABLET, ORALLY DISINTEGRATING ORAL at 06:48

## 2023-01-25 RX ADMIN — OXYCODONE HYDROCHLORIDE 10 MG: 5 TABLET ORAL at 06:53

## 2023-01-25 RX ADMIN — SODIUM CHLORIDE, PRESERVATIVE FREE 10 ML: 5 INJECTION INTRAVENOUS at 08:10

## 2023-01-25 RX ADMIN — DIAZEPAM 5 MG: 5 TABLET ORAL at 09:13

## 2023-01-25 RX ADMIN — HYDROMORPHONE HYDROCHLORIDE 2 MG: 1 INJECTION, SOLUTION INTRAMUSCULAR; INTRAVENOUS; SUBCUTANEOUS at 04:58

## 2023-01-25 RX ADMIN — OXYCODONE HYDROCHLORIDE 15 MG: 15 TABLET ORAL at 10:07

## 2023-01-25 RX ADMIN — HYDROMORPHONE HYDROCHLORIDE 2 MG: 1 INJECTION, SOLUTION INTRAMUSCULAR; INTRAVENOUS; SUBCUTANEOUS at 02:48

## 2023-01-25 RX ADMIN — ACETAMINOPHEN 1000 MG: 500 TABLET ORAL at 02:49

## 2023-01-25 RX ADMIN — OXYCODONE HYDROCHLORIDE 15 MG: 15 TABLET ORAL at 15:14

## 2023-01-25 RX ADMIN — DIAZEPAM 5 MG: 5 TABLET ORAL at 02:49

## 2023-01-25 RX ADMIN — FOLIC ACID 1 MG: 1 TABLET ORAL at 12:27

## 2023-01-25 RX ADMIN — SENNOSIDES 17.2 MG: 8.6 TABLET, COATED ORAL at 21:34

## 2023-01-25 RX ADMIN — DULOXETINE HYDROCHLORIDE 60 MG: 60 CAPSULE, DELAYED RELEASE ORAL at 08:10

## 2023-01-25 ASSESSMENT — PAIN DESCRIPTION - PAIN TYPE
TYPE: SURGICAL PAIN

## 2023-01-25 ASSESSMENT — PAIN DESCRIPTION - FREQUENCY
FREQUENCY: CONTINUOUS

## 2023-01-25 ASSESSMENT — PAIN DESCRIPTION - DESCRIPTORS
DESCRIPTORS: ACHING

## 2023-01-25 ASSESSMENT — PAIN SCALES - GENERAL
PAINLEVEL_OUTOF10: 8
PAINLEVEL_OUTOF10: 7
PAINLEVEL_OUTOF10: 8
PAINLEVEL_OUTOF10: 7
PAINLEVEL_OUTOF10: 7
PAINLEVEL_OUTOF10: 6
PAINLEVEL_OUTOF10: 7
PAINLEVEL_OUTOF10: 7
PAINLEVEL_OUTOF10: 0
PAINLEVEL_OUTOF10: 7
PAINLEVEL_OUTOF10: 8
PAINLEVEL_OUTOF10: 7
PAINLEVEL_OUTOF10: 9
PAINLEVEL_OUTOF10: 7
PAINLEVEL_OUTOF10: 7

## 2023-01-25 ASSESSMENT — PAIN DESCRIPTION - ONSET
ONSET: ON-GOING

## 2023-01-25 ASSESSMENT — PAIN DESCRIPTION - ORIENTATION
ORIENTATION: MID
ORIENTATION: LOWER
ORIENTATION: MID

## 2023-01-25 ASSESSMENT — PAIN DESCRIPTION - LOCATION
LOCATION: BACK

## 2023-01-25 NOTE — PROGRESS NOTES
Physical Therapy  Facility/Department: [place] [address]  Physical Therapy Daily Treatment    Name: Elijah Martin  : 1985  MRN: [id number]  Date of Service: 2023    Discharge Recommendations:    Elijah Martin scored a 20/24 on the AM-PAC short mobility form. Current research shows that an AM-PAC score of 18 or greater is typically associated with a discharge to the patient's home setting. Based on the patient's AM-PAC score and their current functional mobility deficits, it is recommended that the patient have 2-3 sessions per week of Physical Therapy at d/c to increase the patient's independence. At this time, this patient demonstrates the endurance and safety to discharge home with home services and a follow up treatment frequency of 2-3x/wk. Please see assessment section for further patient specific details. If patient discharges prior to next session this note will serve as a discharge summary. Please see below for the latest assessment towards goals. PT Equipment Recommendations  Equipment Needed: No      Patient Diagnosis(es): The encounter diagnosis was S/P lumbar fusion. Past Medical History:  has a past medical history of Anesthesia, Anxiety, Autoimmune disease ([address] Broken jaw ([address] Bronchiolitis, Chronic back pain, Chronic pain syndrome, H/O renal failure, History of kidney stones, IBS (irritable bowel syndrome), Kidney stones, Psychogenic spell, Pyelonephritis, Seizures ([address] Thalassemia minor, and Visceral hypersensitivity syndrome. Past Surgical History:  has a past surgical history that includes Cholecystectomy; other surgical history; Tonsillectomy; hernia repair; Lithotripsy; Upper gastrointestinal endoscopy (2014); cyst removal; and lumbar fusion (N/A, 2023). Assessment   Body Structures, Functions, Activity Limitations Requiring Skilled Therapeutic Intervention: Decreased functional mobility ; Decreased safe awareness;Decreased balance;Decreased endurance; Increased pain;Decreased strength  Assessment: pt is a 43 yo female s/p L4-5 ANTERIOR LUMBAR INTERBODY FUSION WITH PEDICLE SCREW FIXATION, POSSIBLE FACETECTOMY. pt IND prior to admission with mobility, occasionally needs assist with ADLs since back fracture 4 years prior. pt severely limited by pain on this date and became agitated/tearful during session. Tolerated ambulation within room 50ft with SBA with and without RW. Pt noncompliant with using nonskid socks, gait belt, and refused therapist assist when pt had 1 minor LOB, \"I'm more steady than you think I am.\" pt with no concerns for returning home at [place] and denies need for AD. Anticipate pt will progress well with further pain control. Acute PT to follow up, recommend initial 24hr at [place]. Treatment Diagnosis: dec functional mobility  Therapy Prognosis: Good  Decision Making: Medium Complexity  Requires PT Follow-Up: Yes  Activity Tolerance  Activity Tolerance: Patient limited by pain;Treatment limited secondary to agitation  Activity Tolerance Comments: severely limited by pain, pt became agitated then emotional/tearful with therapist during course of session. Provided therapeutic listening and encouragement.      Plan   Physcial Therapy Plan  General Plan: 5-7 times per week  Current Treatment Recommendations: Strengthening, Functional mobility training, Transfer training, Balance training, Endurance training, Gait training, Stair training, Safety education & training, Home exercise program, Patient/Caregiver education & training, Therapeutic activities, Neuromuscular re-education  Safety Devices  Type of Devices: Left in bed, Bed alarm in place, Call light within reach, Nurse notified ( present)     Restrictions  Position Activity Restriction  Other position/activity restrictions: amb pt, up with assist     Subjective   General Comment  Comments: pt cleared to attempt therapy by RN who states pt was previously agitated with OT.  Subjective  Subjective: pt supine in bed and agreeable to PT with gentle coaxing and acknowledgement of pain. Pt c/o frustration throughout her stay \"I'm not trying to be mean but I've been through hell and back. .\" PT provided therapeutic listening as pt became tearful by end of session. Social/Functional History  Social/Functional History  Lives With: Spouse  Type of Home: House  Home Layout: Two level, Performs ADL's on one level, Able to Live on Main level with bedroom/bathroom  Home Access: Stairs to enter with rails  Entrance Stairs - Number of Steps: 10  Bathroom Shower/Tub: Tub/Shower unit  Bathroom Toilet: Standard  Bathroom Equipment:  (planning to get shower chair and grab bars at [employer])  Home Equipment:  (none)  Has the patient had two or more falls in the past year or any fall with injury in the past year?: No  ADL Assistance: Independent  Homemaking Assistance: Independent  Ambulation Assistance: Independent  Transfer Assistance: Independent  Active : No  Additional Comments: reporting broke back 4 years ago and has needed some assistance since then.   Vision/Hearing  Vision  Vision: Within Functional Limits  Hearing  Hearing: Within functional limits    Cognition   Orientation  Overall Orientation Status: Within Functional Limits  Cognition  Overall Cognitive Status: WFL  Cognition Comment: very upset, in pain     Objective   Heart Rate: 97  Heart Rate Source: Monitor  BP: 114/67  BP Location: Left Arm  BP Method: Automatic  Patient Position: Semi fowlers  MAP (Calculated): 83  Resp: 13  SpO2: 96 %  O2 Device: None (Room air)                          Balance  Sitting: Intact  Standing: With support (SBA with and without AD, flexed knees in stance, appeared more steady with RW but adamant about therapist not assisting patient or donning gait belt)  Gait  Overall Level of Assistance:  (functional mobility in room without A device, SBA for safety, pt cautious)  Bed mobility  Supine to Sit: Independent  Sit to Supine: Independent  Scooting: Independent  Bed Mobility Comments: via log roll  Transfers  Sit to Stand: Stand by assistance (from EOB to no AD)  Stand to Sit: Stand by assistance  Comment: pt refusing nonskid socks, gait belt, or therapist assist. RN aware. Ambulation  Surface: Level tile  Device: No Device;Rolling Walker  Assistance: Stand by assistance;Contact guard assistance  Quality of Gait: steady gait, slow and guarded, limited by pain, flexed knees noted with difficulty clearing LEs, pt given RW with improved foot clearance.   Gait Deviations: Slow Kerri;Decreased step length  Distance: 50ft within room  Comments: pt refusing gait belt or therapist assist. Pt had 1 minor LOB posteriorly and PT provided CGA to assist, pt agitated \"I'm more steady than you think I am.\"           AM-PAC Score  AM-PAC Inpatient Mobility Raw Score : 20 (01/25/23 1507)  AM-PAC Inpatient T-Scale Score : 47.67 (01/25/23 1507)  Mobility Inpatient CMS 0-100% Score: 35.83 (01/25/23 1507)  Mobility Inpatient CMS G-Code Modifier : CJ (01/25/23 1507)        Goals  Short Term Goals  Time Frame for Short Term Goals: by dc  Short Term Goal 1: pt will perform bed mobility with mod I - goal met 1/15  Short Term Goal 2: pt will perform functional transfers mod I  Short Term Goal 3: pt will ambulate 150' with mod I  Short Term Goal 4: pt will negotiate 10 steps with 1 HR and mod I  Patient Goals   Patient Goals : to go home       Education  Patient Education  Education Given To: Patient  Education Provided: Role of Therapy;Plan of Care;Precautions  Education Method: Demonstration;Verbal  Barriers to Learning: None  Education Outcome: Verbalized understanding      Therapy Time   Individual Concurrent Group Co-treatment   Time In 1430         Time Out 1453         Minutes 23             Timed Code Treatment Minutes:23    Total Treatment Minutes:23    If patient is discharged prior to next treatment, this note will serve as the discharge summary.     Mikayla Less PT, DPT, NCS, CSRS

## 2023-01-25 NOTE — PROGRESS NOTES
Pt a/o x4. VSS. Denies N/V. Neuro checks WDL. Pt reporting pain level of a 8 on a scale of 1-10. Pt medicate w/ prn oxycodone, valium and dilaudid pca pump. Some relief noted. Will continue to monitor patient.      Pablo Dempsey RN

## 2023-01-25 NOTE — PLAN OF CARE
Problem: Pain  Goal: Verbalizes/displays adequate comfort level or baseline comfort level  Outcome: Progressing  Note: Pt reported pain level of a 8 out of 10. Pt medicated w/ prn meds w/ no relief. Dilaudid pca started. Pt reported relief. Problem: ABCDS Injury Assessment  Goal: Absence of physical injury  Outcome: Progressing  Note: Patient free from falls/ injury during duration of shift. Bed alarm on, call light w/in reach, bed in lowest position, non-skid socks on and fall sign posted outside door.

## 2023-01-25 NOTE — PROGRESS NOTES
Occupational Therapy  Facility/Department: [facility]  Occupational Therapy Progress Note    Name: Rama Doherty  : 1985  MRN: [id number]  Date of Service: 2023    Discharge Recommendations:  Rama Doherty scored a 22/24 on the AM-PAC ADL Inpatient form. Current research shows that an AM-PAC score of 18 or greater is typically associated with a discharge to the patient's home setting. Please see assessment section for further patient specific details. If patient discharges prior to next session this note will serve as a discharge summary. Please see below for the latest assessment towards goals. Patient Diagnosis(es): The encounter diagnosis was S/P lumbar fusion. Past Medical History:  has a past medical history of Anesthesia, Anxiety, Autoimmune disease ([address] Broken jaw ([address] Bronchiolitis, Chronic back pain, Chronic pain syndrome, H/O renal failure, History of kidney stones, IBS (irritable bowel syndrome), Kidney stones, Psychogenic spell, Pyelonephritis, Seizures ([address] Thalassemia minor, and Visceral hypersensitivity syndrome. Past Surgical History:  has a past surgical history that includes Cholecystectomy; other surgical history; Tonsillectomy; hernia repair; Lithotripsy; Upper gastrointestinal endoscopy (2014); cyst removal; and lumbar fusion (N/A, 2023). Treatment Diagnosis: impaired mobility, transfers, ADL      Assessment   Performance deficits / Impairments: Decreased functional mobility ; Decreased ADL status; Decreased endurance  Assessment: Pt having severe pain and upset. Functional mobility and transfers without A device and SBA, functional transfers SBA, indep with bed mobility. Pain control is most limiting factor. Recommend discharge to home with initial A from .   Treatment Diagnosis: impaired mobility, transfers, ADL  REQUIRES OT FOLLOW-UP: Yes        Plan   Occupational Therapy Plan  Times Per Week: 5-7  Current Treatment Recommendations: Balance training, Functional mobility training, Endurance training, Safety education & training, Self-Care / ADL     Restrictions  Position Activity Restriction  Other position/activity restrictions: amb pt, up with assist    Subjective   General  Chart Reviewed: Yes  Additional Pertinent Hx: L4-5 ANTERIOR LUMBAR INTERBODY FUSION WITH PEDICLE SCREW FIXATION, POSSIBLE FACETECTOMY. She has had chronic back pain due to Lumbosacral spondylolysis which has failed medical management on account of which she was admitted for surgery. She has a history of seizures, thalassemia, as well as chronic pain syndrome  Family / Caregiver Present: Yes ()  Referring Practitioner: Merrill Gustafson  Diagnosis: Lumbosacral spondylolysis  Subjective  Subjective: Pt in bed, stating she just received lunch. Pt agreeable to get up to each lunch. OT assisted pt to chair, but pt became upset stating she could not eat sitting in chair. She/ stated she wanted to eat sitting on side of bed. OT cleared request with nurse. Pt assisted to side of bed, but then she declined eating and layed sidelying in bed. Social/Functional History  Social/Functional History  Lives With: Spouse  Type of Home: House  Home Layout: Two level, Performs ADL's on one level, Able to Live on Main level with bedroom/bathroom  Home Access: Stairs to enter with rails  Entrance Stairs - Number of Steps: 10  Bathroom Shower/Tub: Tub/Shower unit  Bathroom Toilet: Standard  Bathroom Equipment:  (planning to get shower chair and grab bars at [employer])  Home Equipment:  (none)  Has the patient had two or more falls in the past year or any fall with injury in the past year?: No  ADL Assistance: Independent  Homemaking Assistance: Independent  Ambulation Assistance: Independent  Transfer Assistance: Independent  Active : No  Additional Comments: reporting broke back 4 years ago and has needed some assistance since then.        Objective Safety Devices  Type of Devices: Left in bed;Bed alarm in place;Call light within reach;Nurse notified ( present)  Balance  Sitting: Intact  Standing: Intact  Gait  Overall Level of Assistance:  (functional mobility in room without A device, SBA for safety, pt cautious)  Toilet Transfers  Toilet - Technique: Ambulating (without A device, SBA , pt moving cautiously)  Equipment Used:  (simulated 3 in 1 commode)  Toilet Transfer: Stand by assistance              Bed mobility  Supine to Sit: Independent  Sit to Supine: Independent  Scooting: Independent  Transfers  Sit to stand: Stand by assistance  Stand to sit: Stand by assistance     Cognition  Overall Cognitive Status: [facility]  Cognition Comment: very upset, in pain  Orientation  Overall Orientation Status: Within Functional Limits                  Education Given To: Patient; Family  Education Method: Demonstration;Verbal  Education Outcome: Verbalized understanding;Demonstrated understanding;Continued education needed                        G-Code     OutComes Score                                                  AM-PAC Score        AM-[facility] Inpatient Daily Activity Raw Score: 22 (01/25/23 1418)  AM-PAC Inpatient ADL T-Scale Score : 47.1 (01/25/23 1418)  ADL Inpatient CMS 0-100% Score: 25.8 (01/25/23 1418)  ADL Inpatient CMS G-Code Modifier : CJ (01/25/23 1418)         Goals  Short Term Goals  Time Frame for Short Term Goals: dc  Short Term Goal 1: Fx mobility with Bruna  Short Term Goal 2: supine to sit ith Bruna  Short Term Goal 3: Fx transfers with Bruna  Short Term Goal 4: LB dressing Bruna  Short Term Goal 5: groomin Bruna in stance       Therapy Time   Individual Concurrent Group Co-treatment   Time In 1340         Time Out 1359         Minutes 19             Timed Code Treatment Minutes:   19    Total Treatment Minutes:   [address] [facility], OTR/L 7862

## 2023-01-25 NOTE — PLAN OF CARE
Problem: Discharge Planning  Goal: Discharge to home or other facility with appropriate resources  1/24/2023 1019 by Barbara Lopez RN  Outcome: Progressing     Problem: Pain  Goal: Verbalizes/displays adequate comfort level or baseline comfort level  1/24/2023 2159 by Venson Moritz, RN  Outcome: Progressing       Problem: ABCDS Injury Assessment  Goal: Absence of physical injury  1/24/2023 2159 by Venson Moritz, RN  Outcome: Progressing       Problem: Skin/Tissue Integrity  Goal: Absence of new skin breakdown  Description: 1. Monitor for areas of redness and/or skin breakdown  2. Assess vascular access sites hourly  3. Every 4-6 hours minimum:  Change oxygen saturation probe site  4. Every 4-6 hours:  If on nasal continuous positive airway pressure, respiratory therapy assess nares and determine need for appliance change or resting period.   1/24/2023 2159 by Venson Moritz, RN  Outcome: Progressing

## 2023-01-25 NOTE — ADDENDUM NOTE
Addendum  created 01/25/23 1141 by Mirza Mann MD    Order Reconciliation Section accessed, Order list changed, Order sets accessed, Pharmacy for encounter modified

## 2023-01-25 NOTE — PROGRESS NOTES
Pt was tearful and family member were also very frustrated that her pain is not managing with this medication. Pt requested to increase Dilaudid dose . Notified MD. Pranay Flores. Got order for change dose of duladid to 2 mg q2h and Anesthesiology consult tomorrow . ... Algis Broaden Algis Broaden Algis Broaden

## 2023-01-25 NOTE — PROGRESS NOTES
Hospitalist Progress Note      PCP: Marlon Cabrera DO    Date of Admission: 1/23/2023    Chief Complaint: back pain    Subjective:     She reports pain at incision wound sites on left abdomen and on both sides back. She developed new swelling on right half of tongue. She shortness of breath. She denies lightheadedness, chest pain, palpitations. She denies nausea, abdominal pain, dysuria. She has not have a bowel movement. Medications:  Reviewed    Infusion Medications    sodium chloride       Scheduled Medications    folic acid  1 mg Oral Daily    vitamin D  5,000 Units Oral Daily    vitamin B-12  1,000 mcg Oral Daily    amphetamine-dextroamphetamine  10 mg Oral Daily    acetaminophen  1,000 mg Oral Q6H    DULoxetine  60 mg Oral Daily    gabapentin  600 mg Oral BID    lacosamide  50 mg Oral BID    sodium chloride flush  5-40 mL IntraVENous 2 times per day    polyethylene glycol  17 g Oral Daily    senna  2 tablet Oral BID    famotidine  20 mg Oral BID    Or    famotidine (PEPCID) injection  20 mg IntraVENous BID    enoxaparin  40 mg SubCUTAneous Daily    lidocaine  1 patch TransDERmal Daily     PRN Meds: oxyCODONE **OR** oxyCODONE, sodium chloride flush, sodium chloride, naloxone, diazePAM, ondansetron **OR** ondansetron      Intake/Output Summary (Last 24 hours) at 1/25/2023 1036  Last data filed at 1/24/2023 2200  Gross per 24 hour   Intake 240 ml   Output --   Net 240 ml         Physical Exam Performed:    /65   Pulse 72   Temp 98.3 °F (36.8 °C) (Oral)   Resp 18   Ht 5' 6\" (1.676 m)   Wt 125 lb (56.7 kg)   LMP 01/04/2023   SpO2 94%   BMI 20.18 kg/m²       GEN alert, in no distress  HEENT normocephalic, anicteric sclera, EOMI, mucosa moist, no stridor  NECK supple, trachea midline  RESP on RA, in no distress, clear to auscultation  CARDS RRR, S1, S2, no murmurs, no edema, radial pulse 2+, DP pulse 2+  ABD +BS, soft tenderness of left abdominal incision wound, dressing clean dry.   MSK incision wounds on left and right back, no cyanosis, no clubbing  SKIN warm, dry  NEURO alert, oriented x 3, no facial asymmetry, no dysarthria, moving spontaneously  PSYCH normal mood      Labs:   Recent Labs     01/24/23  0632   WBC 7.1   HGB 10.5*   HCT 33.0*  32.6*          Recent Labs     01/24/23  0632      K 4.1      CO2 24   BUN 6*   CREATININE <0.5*   CALCIUM 8.5       Recent Labs     01/24/23  0632   AST 68*   ALT 45*   BILITOT 0.6   ALKPHOS 80       Recent Labs     01/24/23  0632   INR 1.10       No results for input(s): Linda John in the last 72 hours. Urinalysis:      Lab Results   Component Value Date/Time    NITRU Negative 12/23/2021 12:15 PM    WBCUA 7 07/06/2015 12:25 PM    BACTERIA 2+ 07/06/2015 12:25 PM    RBCUA 1 07/06/2015 12:25 PM    BLOODU neg 03/24/2022 10:47 AM    BLOODU Negative 12/23/2021 12:15 PM    SPECGRAV 1.030 03/24/2022 10:47 AM    SPECGRAV 1.007 12/23/2021 12:15 PM    GLUCOSEU neg 03/24/2022 10:47 AM    GLUCOSEU Negative 12/23/2021 12:15 PM       Radiology:  CT GUIDED STEREOTACTIC LOCALIZATION   Final Result   1. Intraoperative CT imaging localization during lumbar interbody fusion surgery at L4-L5 as described. XR LUMBAR SPINE (2-3 VIEWS)   Final Result      Intraoperative fluoroscopy was performed. Correlate with procedural note for additional information. FLUORO FOR SURGICAL PROCEDURES   Final Result      Intraoperative fluoroscopy was performed. Correlate with procedural note for additional information. Assessment/Plan: This is a 42 y.o. female who we are asked to see/evaluate by Jhoana Burch MD for medical management. She has had chronic back pain due to Lumbosacral spondylolysis which has failed medical management on account of which she was admitted for surgery.      She has a history of seizures, thalassemia, as well as chronic pain syndrome     She was in the OR 1/23/2023, for L4-5 ANTERIOR LUMBAR INTERBODY FUSION WITH PEDICLE SCREW FIXATION, POSSIBLE FACETECTOMY. It was under general anesthesia, with estimated blood loss of less than 100 cc per. Prior to and since surgery, she has not had chest pain, shortness of breath, dizziness or lightheadedness. Active Hospital Problems    Diagnosis Date Noted    S/P lumbar fusion [Z98.1] 01/25/2023     Priority: Medium    Lumbar stenosis with neurogenic claudication [M48.062] 01/23/2023     Priority: Medium     S/p L4-5 Anterior Lumbar Interbody Fusion With Pedicle Screw Fixation, Possible Facetectomy on 1/23/23  - for lumbar spondylosis. - Pain control with bowel regimen  - Monitor wounds for signs of infection.  - PT/OT. Microcytic Anemia  Thalassemia Trait  - adm HgB 10.5, MCV 71.4. Baseline HgB 11''s-12's. - folate 4.41, B12 259, vitamin D25OH 6.4, iron 70, iron sat 46%, ferritin 322.9, reticulocyte 2.29 on 1/24/23.  - TSH 2.53 on 1/24/23. Folate Deficiency  Vitamin B12 Deficiency  Severe Vitamin D Deficiency  - start folic acid 1 mg daily. - start cyanocobalamin 1000 mcg daily  - start vitamin D3 5000 units daily. Seizures  - partial epilepsy with impairment in consciousness.  - Continue vimpat 50 mg BID. Chronic pain syndrome  - narcotic dependent  - consider baseline pain, treat acute pain adequately. Generalized Anxiety   ADHD  - Continue home cymbalta 60 mg daily, gabapentin 600 mg BID. DVT Prophylaxis: SCD  Diet: ADULT DIET;  Regular  Code Status: Full Code    PT/OT Eval Status: PT/OT    Dispo - when stable    Marshal Arana MD

## 2023-01-25 NOTE — CONSULTS
Anesthesiology    I was asked to consult on this 40yo female s/p L4-5 ANTERIOR LUMBAR INTERBODY FUSION WITH PEDICLE SCREW FIXATION POD #2 regarding postoperative pain control. The patient had been dealing with chronic pain while at home and was on a regimen that included Ketamine 25mg TID, Gabapentin 600mg BID, and Tramadol 50mg TID. Per nursing notes and my conversation with the patient, the PRN Dilaudid that was being administered was doing a reasonable job of controlling her pain, taking her pain scores from near a ten to approximately five, but the pain would return to a peak level about residential through the interval before the next dose would be available. Given this data, that the medication was working but not for a sufficient duration, it seems reasonable to increase the frequency of administration and possibly the dose as well. If not contraindicated from a surgical perspective, the patient will likely do best if she is returned to her preoperative chronic pain regimen (or as much of it as possible). I would suggest continuation of the oral Oxycodone that has been started and would add a demand-only Dilaudid PCA, 0.5mg every fifteen minutes, for the next 24-36 hours should the patient decide to stay in the hospital.  I have discussed this with the patient and will be happy to assist with ordering a PCA should the primary service want to follow this recommendation. Amaya Peterson.  Rhiannon Renae MD  January 25, 2023 11:14 AM

## 2023-01-25 NOTE — PROGRESS NOTES
Clinical Pharmacy Progress Note    Admit date: 1/23/2023     Subjective/Objective:  Pt is a 36yof with a PMHx that includes seizure, chronic back pain who is admitted for L4-5 anterior lumbar interbody fusion with screw fixation. Pharmacy has been consulted to make pain medication recommendations by TAYA Castellon    Home pain regimen:  Ketamine 25 mg TID (confirmed using for pain)  Gabapentin 600  mg BID  Tramadol 50 mg TID    1/24: Patient reports pain scores of 8-9 today. She states that her current pain medication regimen relieves pain down to ~5 however it only lasts for about half of the interval ordered. Patient is very reasonable with her acute post-op pain management expectations and understands the surgery she had was painful and that she is in acute pain. She understands to ask for her PRN pain meds when they are due, and has been doing such. Patient was able to walk with assistance to bathroom/chair. Discussed with patient/ about bringing in home ketamine tablets. They will be discussing this with medical team.  1/25: Pain scores 7-8/10 overnight; had 0/10 documented this AM by nursing staff. IV hydromorphone d/c'd this AM; oxycodone IR dose increased and frequency increased to q3h PRN. Current pain regimen:   Medication  Home med? Amount used last 24 hrs    Acetaminophen 1000 mg q6h No 4000 mg (4 doses)   Diazepam 5 mg q6h PRN No 20 mg (4 doses)   Duloxetine 60 mg daily Yes 1 dose   Gabapentin 600 mg BID Yes 1200 mg (2 dose)   No 14 mg IV (8 doses total)  D/c'd this AM   Lidocaine patch daily No Pt refusing    Oxycodone 10-15 mg PO q3h PRN pain No 50mg (5 doses x 10 mg)  Dose increased this AM            Assessment/Plan:  1)  Pain regimen recommendations:  Hydromorphone IV d/c'd this AM. Oxycodone IR dose increased to 10-15 mg q3h PRN (both dose and frequency increased). Pt refusing lidocaine patches.    Will monitor improvement in pain control with today's changes and provide recommendations as appropriate. Patient will discuss with team/ the possibility of bringing in home ketamine. Will continue to monitor and assist with adjustments to regimen as needed.     Please call with questions--  Anatoly Painter PharmD, BCPS  Wireless: [id number]   1/25/2023 10:03 AM

## 2023-01-25 NOTE — PROGRESS NOTES
NEUROSURGERY POST-OP PROGRESS NOTE    Patient Name: Elsa Solano YOB: 1985   Sex: Female Age: 42 yrs     Medical Record Number: [id number] Raymond Number: [de-identified]   Room Number: 2981/7154-18 Hospital Day: Hospital Day: 3     Interval History: Patient at bedside with visitor who reports she is a CRNA who follows her from Ketamine infusion center, she prescribes the patient PO Ketamine specifically for pain control on an outpatient basis. She reports she is here to advocate for the patient. Patient and visitor both report uncontrolled pain with current regimen. Discussed need for tapering off of IV pain medication as this is not appropriate or even able to be continued at discharge. I discussed our continued concern for ketamine usage at home and usage of high doses of narcotics post operatively. Patient is currently hemodynamically stable and is seen ambulating around her room. Anesthesiology consult has been placed for assistance with acute pain control while she is inpatient. Dr. Davonte Fortune at bedside to discuss. Post-operative Day# 2 s/p L4-5 ANTERIOR LUMBAR INTERBODY FUSION WITH PEDICLE SCREW FIXATION, POSSIBLE FACETECTOMY with Dr. Abdiel Bean    Subjective: Patient reports uncontrolled pain without dilaudid IV, wore off too quickly. Feels that people are snapping at her and not adequately treating her pain.      Objective:    VITAL SIGNS   /67   Pulse 97   Temp 98.2 °F (36.8 °C) (Oral)   Resp 18   Ht 5' 6\" (1.676 m)   Wt 125 lb (56.7 kg)   LMP 01/04/2023   SpO2 96%   BMI 20.18 kg/m²    Height Height: 5' 6\" (167.6 cm)   Weight Weight: 125 lb (56.7 kg)        Allergies Allergies   Allergen Reactions    Flexeril [Cyclobenzaprine]      Altered mental status    Minocycline Anaphylaxis    Toradol [Ketorolac Tromethamine]      Passes out    Antihistamines, Chlorpheniramine-Type Other (See Comments)     Pt states she passes out    Benadryl [Diphenhydramine] Other (See Comments) Black out    Carbamazepine Other (See Comments)     HYPOTENSION    Dextromethorphan Other (See Comments)     PASSED OUT    Divalproex Sodium Other (See Comments)     HYPOTENSION    Fosphenytoin Other (See Comments)     HYPOTENSION    Hydroxyzine Hcl Other (See Comments)     HYPOTENSION    Lamotrigine Other (See Comments)     HYPOTENSION    Levetiracetam Hives    Morphine And Related Other (See Comments)     CHEST PRESSURE    Other      All seizure meds, except gabapentin. Different reactions with each med  All seizure meds, except gabapentin. Different reactions with each med      Oxcarbazepine Other (See Comments)     AGITATION    Oxycodone-Acetaminophen Other (See Comments)     Aggressiveness     Okay with vicodin dilaudid demerol  Aggressiveness     Okay with vicodin dilaudid demerol  Agressiveness; Okay with Vicodin, Dilaudid, Demerol  Does not like      Reglan [Metoclopramide]      Passes out    Topiramate Other (See Comments)     MIGRANE    Metronidazole Rash     Seizures. Sudafed [Pseudoephedrine Hcl] Anxiety      NPO Status ADULT DIET;  Regular   Isolation No active isolations     LABS   Basic Metabolic Profile Recent Labs     01/24/23  0632         CO2 24   BUN 6*   CREATININE <0.5*   GLUCOSE 91      Complete Blood Count Recent Labs     01/24/23  0632   WBC 7.1   RBC 4.56      Coagulation Studies Recent Labs     01/24/23  3768   INR 1.10        MEDICATIONS   Inpatient Medications     folic acid, 1 mg, Oral, Daily    vitamin D, 5,000 Units, Oral, Daily    vitamin B-12, 1,000 mcg, Oral, Daily    amphetamine-dextroamphetamine, 10 mg, Oral, Daily    acetaminophen, 1,000 mg, Oral, Q6H    DULoxetine, 60 mg, Oral, Daily    gabapentin, 600 mg, Oral, BID    lacosamide, 50 mg, Oral, BID    sodium chloride flush, 5-40 mL, IntraVENous, 2 times per day    polyethylene glycol, 17 g, Oral, Daily    senna, 2 tablet, Oral, BID    famotidine, 20 mg, Oral, BID **OR** famotidine (PEPCID) injection, 20 mg, IntraVENous, BID    enoxaparin, 40 mg, SubCUTAneous, Daily    lidocaine, 1 patch, TransDERmal, Daily   Infusions    HYDROmorphone      sodium chloride        Antibiotics   Recent Abx Admin        No antibiotic orders with administrations found. Neurologic Exam:    Mental status: awake/alert, oriented x 4    Musculoskeletal:   Gait: Not tested   Tone: normal   Motor strength:    Right  Left    Right  Left    Deltoid  5 5  Hip Flex  5 5   Biceps  5 5  Knee Extensors  5 5   Triceps  5 5  Knee Flexors  5 5   Wrist Ext  5 5  Ankle Dorsiflex. 5 5   Wrist Flex  5 5  Ankle Plantarflex. 5 5   Handgrip  5 5  Ext Esau Longus  5 5   Thumb Ext  5 5         Incision: dermabond REJI, all are clean dry and intact     Respiratory:  Unlabored respiratory pattern    Abdomen:   Soft, ND   Last BM preop    Cardiovascular:  Warm, well perfused    Assessment   43 yo female POD 2 s/p L4-5 ANTERIOR LUMBAR INTERBODY FUSION WITH PEDICLE SCREW FIXATION, POSSIBLE FACETECTOMY with Dr. Kenya Bar:  Neurologic exam frequency: q 4   Mobility: PT/OT, activity as tolerated   Diet: ADAT  DVT Prophylaxis: SCDs and SQ lovenox  GI Prophylaxis: pepcid   Bowel Regimen: senokot, glycolax   Pain control: defer to anesthesiology recommendations while inpatient, PCA ordered by Dr. Geetha Powers for 24 hours  Pharmacy consult - also following for assistance with pain control   Valium for muscle spasms   Incisional Care: cleanse daily with soap/water, pat dry with clean towel   Dispo Planning: continue care on 5T, plan to discharge home tmw     Patient was discussed with Dr. Philippe Frey who also spoke to patient and her visitor via phone, he is in agreement with above assessment and plan. Electronically signed by:  TAYA Little NP, 1/25/2023 1:05 PM   Neurosurgery Nurse Practitioner  [phone]

## 2023-01-25 NOTE — DISCHARGE SUMMARY
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 Arden Garcia MD  Procedures: Procedure(s) (LRB):  L4-5 ANTERIOR LUMBAR INTERBODY FUSION WITH PEDICLE SCREW FIXATION, POSSIBLE FACETECTOMY (N/A)  Consults: IP CONSULT TO HOSPITALIST  IP CONSULT TO PHARMACY  IP CONSULT TO ANESTHESIOLOGY    Reason for Admission:  Kathy Niño is a 42 y.o. female patient who was admitted to the hospital for complaints of back and left leg pain that did not respond to conservative management. She underwent the procedure listed above on 1/23/2023. Hospital Course:  After surgery, patient complained of uncontrolled back pain and bilateral hip pain. She denies incisional pain. That patient received inpatient consults from pharmacy and anesthesia to assist with acute pain control. Despite multiple interventions including dilaudid PCA pump, she continued to complain of uncontrolled pain. Dr. Arden Garcia discussed concerns with patient that we will not be able to control her pain given her ketamine use prior to surgery. We discussed PO medications that she will be prescribed at discharge and do not recommend/condone nor will be managing PO Ketamine use at discharge. Patient's incisions were clean/dry/intact with no erythema or edema at time of discharge. Prior to discharging she was tolerating PO intake, urinating and ambulating on her own with a steady gate. She was cleared for discharge by PT/OT.         Discharge Vitals/Labs:  /61   Pulse 72   Temp 98.2 °F (36.8 °C) (Oral)   Resp 13   Ht 5' 6\" (1.676 m)   Wt 125 lb (56.7 kg)   LMP 01/04/2023   SpO2 100%   BMI 20.18 kg/m²   CBC with Differential:    Lab Results   Component Value Date/Time    WBC 8.2 01/25/2023 01:20 PM    RBC 4.70 01/25/2023 01:20 PM    HGB 10.5 01/25/2023 01:20 PM    HCT 33.1 01/25/2023 01:20 PM     01/25/2023 01:20 PM    MCV 70.4 01/25/2023 01:20 PM    MCH 22.3 01/25/2023 01:20 PM    MCHC 31.6 01/25/2023 01:20 PM    RDW 19.2 01/25/2023 01:20 PM    BANDSPCT 1 07/14/2014 01:28 AM    LYMPHOPCT 13.1 01/25/2023 01:20 PM    MONOPCT 11.9 01/25/2023 01:20 PM    BASOPCT 0.5 01/25/2023 01:20 PM    MONOSABS 1.0 01/25/2023 01:20 PM    LYMPHSABS 1.1 01/25/2023 01:20 PM    EOSABS 0.1 01/25/2023 01:20 PM    BASOSABS 0.0 01/25/2023 01:20 PM     CMP:    Lab Results   Component Value Date/Time     01/25/2023 01:20 PM    K 3.4 01/25/2023 01:20 PM    K 4.1 01/24/2023 06:32 AM     01/25/2023 01:20 PM    CO2 29 01/25/2023 01:20 PM    BUN 8 01/25/2023 01:20 PM    CREATININE 0.6 01/25/2023 01:20 PM    GFRAA >60 04/06/2022 10:21 AM    AGRATIO 1.8 01/24/2023 06:32 AM    LABGLOM >60 01/25/2023 01:20 PM    GLUCOSE 105 01/25/2023 01:20 PM    PROT 5.4 01/24/2023 06:32 AM    LABALBU 3.5 01/24/2023 06:32 AM    CALCIUM 8.7 01/25/2023 01:20 PM    BILITOT 0.6 01/24/2023 06:32 AM    ALKPHOS 80 01/24/2023 06:32 AM    AST 68 01/24/2023 06:32 AM    ALT 45 01/24/2023 06:32 AM       Discharge Medications: The patient suffers from a major neurological surgery that pain cannot be managed within an average of 30 MED per day. Severe acute postoperative pain is the reason for exceeding the 30 MED average, and the prescription reflects the same dosage patient received while inpatient, which is the lowest dose consistent with the patients medical condition. Non-opioid treatment options have been considered prior to prescribing opioids, and the patient has been advised of the benefits and risks of the opioid (including the potential for addiction).      Medication List        START taking these medications      cyanocobalamin 1000 MCG tablet  Take 1 tablet by mouth daily     diazePAM 5 MG tablet  Commonly known as: VALIUM  Take 1 tablet by mouth every 6 hours as needed (Muscle spasms) for up to 10 days. Max Daily Amount: 20 mg     folic acid 1 MG tablet  Commonly known as: FOLVITE  Take 1 tablet by mouth daily     oxyCODONE HCl 10 MG immediate release tablet  Commonly known as: OXY-IR  Take 1-1.5 tablets by mouth every 3 hours as needed for Pain for up to 7 days. Max Daily Amount: 120 mg     polyethylene glycol 17 g packet  Commonly known as: GLYCOLAX  Take 17 g by mouth daily     senna 8.6 MG tablet  Commonly known as: SENOKOT  Take 2 tablets by mouth 2 times daily for 10 days     vitamin D 125 MCG (5000 UT) Caps capsule  Commonly known as: CHOLECALCIFEROL  Take 1 capsule by mouth daily            CONTINUE taking these medications      DULoxetine 60 MG extended release capsule  Commonly known as: CYMBALTA     gabapentin 600 MG tablet  Commonly known as: NEURONTIN  Take 1 tablet by mouth twice daily     lacosamide 50 MG Tabs tablet  Commonly known as: VIMPAT  Take 1 tablet by mouth twice daily     ondansetron 8 MG Tbdp disintegrating tablet  Commonly known as: ZOFRAN-ODT  Place 1 tablet under the tongue every 8 hours as needed for Nausea or Vomiting            STOP taking these medications      Ketamine HCl 100 MG Troc     traMADol 50 MG tablet  Commonly known as: ULTRAM               Where to Get Your Medications        You can get these medications from any pharmacy    Bring a paper prescription for each of these medications  cyanocobalamin 1000 MCG tablet  diazePAM 5 MG tablet  folic acid 1 MG tablet  oxyCODONE HCl 10 MG immediate release tablet  polyethylene glycol 17 g packet  senna 8.6 MG tablet  You don't need a prescription for these medications  vitamin D 125 MCG (5000 UT) Caps capsule         Discharge Destination:  The patient was discharged to Home.     Follow-up:  The patient is to follow-up with Austin Schafer MD in the office on 2/7/23.      Discharge Instructions:   Verbal and written discharge instructions were given to the patient at the time of discharge.       Electronically  signed by:  TAYA Kincaid NP, 1/25/2023 10:27 AM  [phone]

## 2023-01-25 NOTE — PROGRESS NOTES
Pt is A&Ox4. Pt up to bathroom  Assist with gait belt and walker. Pt is tolerating Dilaudid q2h and oxy q3h on time. Pt  pain down to 7 with new pain meds dose changes . Denies nausea and vomiting. Voiding adequately. Incision site CDI open to air. Cleaned with shop and water. All fall precaution are in place. Will continue monitor . Ananda Pena ...

## 2023-01-26 VITALS
OXYGEN SATURATION: 100 % | SYSTOLIC BLOOD PRESSURE: 129 MMHG | DIASTOLIC BLOOD PRESSURE: 61 MMHG | TEMPERATURE: 98.2 F | BODY MASS INDEX: 20.09 KG/M2 | HEIGHT: 66 IN | WEIGHT: 125 LBS | RESPIRATION RATE: 14 BRPM | HEART RATE: 72 BPM

## 2023-01-26 LAB
A/G RATIO: 1.5 (ref 1.1–2.2)
ALBUMIN SERPL-MCNC: 3.6 G/DL (ref 3.4–5)
ALP BLD-CCNC: 92 U/L (ref 40–129)
ALT SERPL-CCNC: 28 U/L (ref 10–40)
ANION GAP SERPL CALCULATED.3IONS-SCNC: 8 MMOL/L (ref 3–16)
AST SERPL-CCNC: 43 U/L (ref 15–37)
BILIRUB SERPL-MCNC: 0.3 MG/DL (ref 0–1)
BUN BLDV-MCNC: 11 MG/DL (ref 7–20)
CALCIUM SERPL-MCNC: 8.9 MG/DL (ref 8.3–10.6)
CHLORIDE BLD-SCNC: 103 MMOL/L (ref 99–110)
CO2: 30 MMOL/L (ref 21–32)
CREAT SERPL-MCNC: <0.5 MG/DL (ref 0.6–1.1)
GFR SERPL CREATININE-BSD FRML MDRD: >60 ML/MIN/{1.73_M2}
GLUCOSE BLD-MCNC: 123 MG/DL (ref 70–99)
MAGNESIUM: 1.6 MG/DL (ref 1.8–2.4)
PHOSPHORUS: 4.1 MG/DL (ref 2.5–4.9)
POTASSIUM SERPL-SCNC: 4.2 MMOL/L (ref 3.5–5.1)
SODIUM BLD-SCNC: 141 MMOL/L (ref 136–145)
TOTAL PROTEIN: 6 G/DL (ref 6.4–8.2)

## 2023-01-26 PROCEDURE — 6370000000 HC RX 637 (ALT 250 FOR IP): Performed by: NURSE PRACTITIONER

## 2023-01-26 PROCEDURE — 99024 POSTOP FOLLOW-UP VISIT: CPT | Performed by: NURSE PRACTITIONER

## 2023-01-26 PROCEDURE — 2580000003 HC RX 258: Performed by: ANESTHESIOLOGY

## 2023-01-26 PROCEDURE — 83735 ASSAY OF MAGNESIUM: CPT

## 2023-01-26 PROCEDURE — 97530 THERAPEUTIC ACTIVITIES: CPT

## 2023-01-26 PROCEDURE — 84100 ASSAY OF PHOSPHORUS: CPT

## 2023-01-26 PROCEDURE — A4216 STERILE WATER/SALINE, 10 ML: HCPCS | Performed by: ANESTHESIOLOGY

## 2023-01-26 PROCEDURE — 2580000003 HC RX 258: Performed by: NURSE PRACTITIONER

## 2023-01-26 PROCEDURE — 6370000000 HC RX 637 (ALT 250 FOR IP): Performed by: INTERNAL MEDICINE

## 2023-01-26 PROCEDURE — 6360000002 HC RX W HCPCS: Performed by: ANESTHESIOLOGY

## 2023-01-26 PROCEDURE — 94761 N-INVAS EAR/PLS OXIMETRY MLT: CPT

## 2023-01-26 PROCEDURE — 2700000000 HC OXYGEN THERAPY PER DAY

## 2023-01-26 PROCEDURE — 80053 COMPREHEN METABOLIC PANEL: CPT

## 2023-01-26 PROCEDURE — APPNB45 APP NON BILLABLE 31-45 MINUTES: Performed by: NURSE PRACTITIONER

## 2023-01-26 PROCEDURE — 36415 COLL VENOUS BLD VENIPUNCTURE: CPT

## 2023-01-26 RX ORDER — LACTULOSE 10 G/15ML
20 SOLUTION ORAL ONCE
Status: COMPLETED | OUTPATIENT
Start: 2023-01-26 | End: 2023-01-26

## 2023-01-26 RX ADMIN — DOCUSATE SODIUM 100 MG: 100 CAPSULE, LIQUID FILLED ORAL at 08:47

## 2023-01-26 RX ADMIN — Medication 5000 UNITS: at 08:47

## 2023-01-26 RX ADMIN — SENNOSIDES 17.2 MG: 8.6 TABLET, COATED ORAL at 08:47

## 2023-01-26 RX ADMIN — LACOSAMIDE 50 MG: 50 TABLET, FILM COATED ORAL at 08:47

## 2023-01-26 RX ADMIN — CYANOCOBALAMIN TAB 1000 MCG 1000 MCG: 1000 TAB at 08:48

## 2023-01-26 RX ADMIN — DULOXETINE HYDROCHLORIDE 60 MG: 60 CAPSULE, DELAYED RELEASE ORAL at 08:47

## 2023-01-26 RX ADMIN — FAMOTIDINE 20 MG: 20 TABLET, FILM COATED ORAL at 08:48

## 2023-01-26 RX ADMIN — HYDROMORPHONE HYDROCHLORIDE: 10 INJECTION, SOLUTION INTRAMUSCULAR; INTRAVENOUS; SUBCUTANEOUS at 04:27

## 2023-01-26 RX ADMIN — ACETAMINOPHEN 1000 MG: 500 TABLET ORAL at 08:48

## 2023-01-26 RX ADMIN — LACTULOSE 20 G: 20 SOLUTION ORAL at 12:59

## 2023-01-26 RX ADMIN — OXYCODONE HYDROCHLORIDE 15 MG: 15 TABLET ORAL at 09:21

## 2023-01-26 RX ADMIN — POLYETHYLENE GLYCOL (3350) 17 G: 17 POWDER, FOR SOLUTION ORAL at 08:59

## 2023-01-26 RX ADMIN — ACETAMINOPHEN 1000 MG: 500 TABLET ORAL at 05:31

## 2023-01-26 RX ADMIN — GABAPENTIN 600 MG: 600 TABLET, FILM COATED ORAL at 08:47

## 2023-01-26 RX ADMIN — FOLIC ACID 1 MG: 1 TABLET ORAL at 08:47

## 2023-01-26 RX ADMIN — SODIUM CHLORIDE, PRESERVATIVE FREE 10 ML: 5 INJECTION INTRAVENOUS at 08:59

## 2023-01-26 RX ADMIN — OXYCODONE HYDROCHLORIDE 15 MG: 15 TABLET ORAL at 14:19

## 2023-01-26 ASSESSMENT — PAIN DESCRIPTION - LOCATION
LOCATION: BACK
LOCATION: BACK;HIP
LOCATION: BACK
LOCATION: BACK

## 2023-01-26 ASSESSMENT — PAIN - FUNCTIONAL ASSESSMENT
PAIN_FUNCTIONAL_ASSESSMENT: PREVENTS OR INTERFERES SOME ACTIVE ACTIVITIES AND ADLS
PAIN_FUNCTIONAL_ASSESSMENT: PREVENTS OR INTERFERES WITH MANY ACTIVE NOT PASSIVE ACTIVITIES
PAIN_FUNCTIONAL_ASSESSMENT: PREVENTS OR INTERFERES SOME ACTIVE ACTIVITIES AND ADLS

## 2023-01-26 ASSESSMENT — PAIN DESCRIPTION - DESCRIPTORS
DESCRIPTORS: ACHING
DESCRIPTORS: BURNING;SHARP
DESCRIPTORS: ACHING
DESCRIPTORS: ACHING

## 2023-01-26 ASSESSMENT — PAIN SCALES - GENERAL
PAINLEVEL_OUTOF10: 7
PAINLEVEL_OUTOF10: 10
PAINLEVEL_OUTOF10: 10
PAINLEVEL_OUTOF10: 7
PAINLEVEL_OUTOF10: 8
PAINLEVEL_OUTOF10: 7
PAINLEVEL_OUTOF10: 5

## 2023-01-26 ASSESSMENT — PAIN DESCRIPTION - ORIENTATION
ORIENTATION: MID
ORIENTATION: LOWER
ORIENTATION: LOWER
ORIENTATION: LEFT
ORIENTATION: MID
ORIENTATION: LOWER

## 2023-01-26 ASSESSMENT — PAIN DESCRIPTION - ONSET
ONSET: ON-GOING

## 2023-01-26 ASSESSMENT — PAIN DESCRIPTION - PAIN TYPE
TYPE: SURGICAL PAIN

## 2023-01-26 ASSESSMENT — PAIN DESCRIPTION - FREQUENCY
FREQUENCY: CONTINUOUS

## 2023-01-26 NOTE — PROGRESS NOTES
Occupational Therapy  Occupational Therapy  Daily Treatment Note  Patient Name: Rama Doherty  MRN: [id number]    Chart Reviewed: Yes       Other position/activity restrictions: amb pt, up with assist     Additional Pertinent Hx: pt is a 41 yo female s/p L4-5 ANTERIOR LUMBAR INTERBODY FUSION WITH PEDICLE SCREW FIXATION, POSSIBLE FACETECTOMY      Diagnosis: Lumbosacral spondylolysis  Treatment Diagnosis: impaired mobility, transfers, ADL    Subjective: Pt (2L O2) seated in recliner upon entry, declined ADLs agreeable to therapy session/functional mobility. General Comment: Pt sit to stand SBA, ambulated no device SBA/CGA ~40ft in room and stand to sit SBA. Call light in reach and chair alarm on. Pain: 8/10 back, pt declined intervention \"Don't tell them , they don't care. If you tell them it will just make my mental health worse. \" During ambulation pt stating both hips hurt. Social/Functional History  Lives With: Spouse  Type of Home: House  Home Layout: Two level, Performs ADL's on one level, Able to Live on Main level with bedroom/bathroom  Home Access: Stairs to enter with rails  Entrance Stairs - Number of Steps: 10  Bathroom Shower/Tub: Tub/Shower unit  Bathroom Toilet: Standard  Bathroom Equipment:  (planning to get shower chair and grab bars at [employer])  Home Equipment:  (none)  Has the patient had two or more falls in the past year or any fall with injury in the past year?: No  ADL Assistance: Independent  Homemaking Assistance: Independent  Ambulation Assistance: Independent  Transfer Assistance: Independent  Active : No  Additional Comments: reporting broke back 4 years ago and has needed some assistance since then. Prior Function  ADL Assistance: Independent  Homemaking Assistance: Independent  Ambulation Assistance: Independent  Transfer Assistance: Independent  Additional Comments: reporting broke back 4 years ago and has needed some assistance since then.     Objective: Cognition/Orientation:WFL, tearful when discussing pain    Bed mobility - N/A    Functional Mobility   Sit to Stand:SBA  Stand to Sit:SBA  Bed to Chair Transfer: N/A  Commode Transfer:N/A      ADLs - no ADLs completed    UE Exercises N/A    Activity Tolerance: Fair, limited by pain      Patient Education: d/c rec, role of OT    Safety Devices in Place: chair alarm on and call light in reach    Assessment: Pt continues to have severe back pain and pain control is a limiting factor. Pt transferred from/to recliner SBA and ambulated in room ~40ft on 2L O2 at SBA/CGA no device. Recommend d/c home with assist. Continue with POC. Discharge Recommendations: Moncho Zelaya scored a 21/24 on the AM-PAC ADL Inpatient form. Current research shows that an AM-PAC score of 18 or greater is typically associated with a discharge to the patient's home setting. Based on the patient's AM-PAC score, and their current ADL deficits, it is recommended that the patient have 2-3 sessions per week of Occupational Therapy at d/c to increase the patient's independence. At this time, this patient demonstrates the endurance and safety to discharge home with assist at home  and a follow up treatment frequency of 2-3x/wk. Please see assessment section for further patient specific details. If patient discharges prior to next session this note will serve as a discharge summary. Please see below for the latest assessment towards goals.     Equipment Needs: No     Therapy Time:   Individual Concurrent Group Co-treatment   Time In  1000         Time Out  1015         Minutes  15         Timed Code Treatment Minutes: 15 minutes  Total Treatment Minutes: 15 minutes    Goals:  Short Term Goals  Time Frame for Short Term Goals: dc  Short Term Goal 1: Fx mobility with Bruna  Short Term Goal 2: supine to sit ith Bruna  Short Term Goal 3: Fx transfers with Bruna  Short Term Goal 4: LB dressing Bruna  Short Term Goal 5: groomin Bruna in stance No goals met 1/26, continue all goals     Plan:      Times Per Week: 5-7        If patient is discharged prior to next treatment, this note will serve as the discharge summary.     Katty Godinez, [address]

## 2023-01-26 NOTE — PROGRESS NOTES
Pt alert/oriented x4, VSS on RA. Pt endorsing noticing a difference and improvement in pain control with Hydromorphone PCA pump usage, is hopeful to continue to progress with pain management. Pt voiding adequately via commode, tolerating PO without emesis. Incisions to post back CDI. Fall and safety precautions maintained.

## 2023-01-26 NOTE — PLAN OF CARE
Problem: Discharge Planning  Goal: Discharge to home or other facility with appropriate resources  Outcome: Progressing     Problem: ABCDS Injury Assessment  Goal: Absence of physical injury  1/26/2023 1443 by Hira Burger RN  Outcome: Progressing     Problem: Skin/Tissue Integrity  Goal: Absence of new skin breakdown  Description: 1. Monitor for areas of redness and/or skin breakdown  2. Assess vascular access sites hourly  3. Every 4-6 hours minimum:  Change oxygen saturation probe site  4. Every 4-6 hours:  If on nasal continuous positive airway pressure, respiratory therapy assess nares and determine need for appliance change or resting period.   1/26/2023 1443 by Hira Burger, RN  Outcome: Progressing

## 2023-01-26 NOTE — PROGRESS NOTES
Physical Therapy    Attempted therapy session, pt supine in bed and declining participation stating \"someone already came in earlier today and walked me around\". Pt not interested in further OOB activity despite encouragement and expresses no concerns for returning home later today. Will follow up as treatment schedule allows if pt does not dc today.      Gary Trinidad, PT, DPT

## 2023-01-26 NOTE — PROGRESS NOTES
Hospitalist Progress Note      PCP: Marci Trujillo DO    Date of Admission: 1/23/2023    Chief Complaint: back pain    Subjective:     She still feels her pain is not controlled. Patient is on a PCA pump. States that she has high tolerance to pain medications. She denies lightheadedness, shortness of breath, chest pain, palpitations. She denies nausea, abdominal pain, dysuria. She has not have a bowel movement while on miralax, docusate, and sennokot. Agreeable to lactulose.     Medications:  Reviewed    Infusion Medications    sodium chloride       Scheduled Medications    lactulose  20 g Oral Once    folic acid  1 mg Oral Daily    vitamin D  5,000 Units Oral Daily    vitamin B-12  1,000 mcg Oral Daily    docusate sodium  100 mg Oral BID    amphetamine-dextroamphetamine  10 mg Oral Daily    acetaminophen  1,000 mg Oral Q6H    DULoxetine  60 mg Oral Daily    gabapentin  600 mg Oral BID    lacosamide  50 mg Oral BID    sodium chloride flush  5-40 mL IntraVENous 2 times per day    polyethylene glycol  17 g Oral Daily    senna  2 tablet Oral BID    famotidine  20 mg Oral BID    Or    famotidine (PEPCID) injection  20 mg IntraVENous BID    enoxaparin  40 mg SubCUTAneous Daily    lidocaine  1 patch TransDERmal Daily     PRN Meds: oxyCODONE **OR** oxyCODONE, naloxone, polyethylene glycol, sodium chloride flush, sodium chloride, naloxone, diazePAM, ondansetron **OR** ondansetron      Intake/Output Summary (Last 24 hours) at 1/26/2023 1247  Last data filed at 1/26/2023 6103  Gross per 24 hour   Intake 247.5 ml   Output 175 ml   Net 72.5 ml         Physical Exam Performed:    /60   Pulse 68   Temp 97.8 °F (36.6 °C) (Oral)   Resp 14   Ht 5' 6\" (1.676 m)   Wt 125 lb (56.7 kg)   LMP 01/04/2023   SpO2 100%   BMI 20.18 kg/m²       GEN alert, in no distress  HEENT normocephalic, anicteric sclera, EOMI, mucosa moist, no stridor  NECK supple, trachea midline  RESP on RA, in no distress, clear to auscultation  CARDS RRR, S1, S2, no murmurs, no edema, radial pulse 2+, DP pulse 2+  ABD +BS, soft tenderness of left abdominal incision wound open to air, clean dry. MSK incision wounds on left and right back clean dry, no cyanosis, no clubbing  SKIN warm, dry  NEURO alert, oriented x 3, no facial asymmetry, no dysarthria, moving spontaneously  PSYCH normal mood      Labs:   Recent Labs     01/24/23  0632 01/25/23  1320   WBC 7.1 8.2   HGB 10.5* 10.5*   HCT 33.0*  32.6* 33.1*    210       Recent Labs     01/24/23  0632 01/25/23  1320    139   K 4.1 3.4*    103   CO2 24 29   BUN 6* 8   CREATININE <0.5* 0.6   CALCIUM 8.5 8.7       Recent Labs     01/24/23  0632   AST 68*   ALT 45*   BILITOT 0.6   ALKPHOS 80       Recent Labs     01/24/23  0632   INR 1.10       No results for input(s): Cherie Comber in the last 72 hours. Urinalysis:      Lab Results   Component Value Date/Time    NITRU Negative 12/23/2021 12:15 PM    WBCUA 7 07/06/2015 12:25 PM    BACTERIA 2+ 07/06/2015 12:25 PM    RBCUA 1 07/06/2015 12:25 PM    BLOODU neg 03/24/2022 10:47 AM    BLOODU Negative 12/23/2021 12:15 PM    SPECGRAV 1.030 03/24/2022 10:47 AM    SPECGRAV 1.007 12/23/2021 12:15 PM    GLUCOSEU neg 03/24/2022 10:47 AM    GLUCOSEU Negative 12/23/2021 12:15 PM       Radiology:  CT GUIDED STEREOTACTIC LOCALIZATION   Final Result   1. Intraoperative CT imaging localization during lumbar interbody fusion surgery at L4-L5 as described. XR LUMBAR SPINE (2-3 VIEWS)   Final Result      Intraoperative fluoroscopy was performed. Correlate with procedural note for additional information. FLUORO FOR SURGICAL PROCEDURES   Final Result      Intraoperative fluoroscopy was performed. Correlate with procedural note for additional information. Assessment/Plan: This is a 42 y.o. female who we are asked to see/evaluate by Zoila Cherelle Frey MD for medical management.      She has had chronic back pain due to Lumbosacral spondylolysis which has failed medical management on account of which she was admitted for surgery. She has a history of seizures, thalassemia, as well as chronic pain syndrome     She was in the OR 1/23/2023, for L4-5 ANTERIOR LUMBAR INTERBODY FUSION WITH PEDICLE SCREW FIXATION, POSSIBLE FACETECTOMY. It was under general anesthesia, with estimated blood loss of less than 100 cc per. Prior to and since surgery, she has not had chest pain, shortness of breath, dizziness or lightheadedness. Active Hospital Problems    Diagnosis Date Noted    S/P lumbar fusion [Z98.1] 01/25/2023     Priority: Medium    Lumbar stenosis with neurogenic claudication [M48.062] 01/23/2023     Priority: Medium     S/p L4-5 Anterior Lumbar Interbody Fusion With Pedicle Screw Fixation, Possible Facetectomy on 1/23/23  - for lumbar spondylosis. - Pain control with bowel regimen. Ordered lactulose. - Monitor wounds for signs of infection.  - PT/OT. Microcytic Anemia  Thalassemia Trait  - adm HgB 10.5, MCV 71.4. Baseline HgB 11''s-12's. - folate 4.41, B12 259, vitamin D25OH 6.4, iron 70, iron sat 46%, ferritin 322.9, reticulocyte 2.29 on 1/24/23.  - TSH 2.53 on 1/24/23. Folate Deficiency  Vitamin B12 Deficiency  Severe Vitamin D Deficiency  - start folic acid 1 mg daily. - start cyanocobalamin 1000 mcg daily  - start vitamin D3 5000 units daily. Seizures  - partial epilepsy with impairment in consciousness.  - Continue vimpat 50 mg BID. Chronic pain syndrome  - narcotic dependent  - consider baseline pain, treat acute pain adequately. Generalized Anxiety   ADHD  - Continue home cymbalta 60 mg daily, gabapentin 600 mg BID. DVT Prophylaxis: SCD  Diet: ADULT DIET;  Regular  Code Status: Full Code    PT/OT Eval Status: PT/OT    Dispo - when stable    Quinn Clarke MD

## 2023-01-26 NOTE — PROGRESS NOTES
Pt alert and oriented x4. Significant other at bedside. Managed pain with meds per [facility] as well as PCA pump this shift. Pt was agitated and upset about the pain medication regime this morning stating she is not getting relief from the pain meds here at the hospital. No acute events this shift. VSS. Fall precautions in place, call light within reach. Pt's significant other went to get pt's meds from outpt pharmacy. Getting ready to discharge to home. Will continue to monitor.

## 2023-01-26 NOTE — PROGRESS NOTES
NEUROSURGERY POST-OP PROGRESS NOTE    Patient Name: Les Regalado YOB: 1985   Sex: Female Age: 42 yrs     Medical Record Number: [id number] Raymond Number: [de-identified]   Room Number: 9785/6108-82 Hospital Day: Hospital Day: 4     Interval History:     1/25: Patient at bedside with visitor who reports she is a CRNA who follows her from [facility] infusion center, she prescribes the patient PO Ketamine specifically for pain control on an outpatient basis. She reports she is here to advocate for the patient. Patient and visitor both report uncontrolled pain with current regimen. Discussed need for tapering off of IV pain medication as this is not appropriate or even able to be continued at discharge. I discussed our continued concern for ketamine usage at home and usage of high doses of narcotics post operatively. Patient is currently hemodynamically stable and is seen ambulating around her room. Anesthesiology consult has been placed for assistance with acute pain control while she is inpatient. Dr. Suzette Pulliam at bedside to discuss, recommended dilaudid PCA pump for 24 hours. 1/26: Patient continuously frustrated with PCA pump beeping off given low respirations/unable to eat with monitor in place. She would like this discontinued today and wants to go home. She continues to complain of pain in back and bilateral hips when up with therapy. Post-operative Day# 3 s/p L4-5 ANTERIOR LUMBAR INTERBODY FUSION WITH PEDICLE SCREW FIXATION, POSSIBLE FACETECTOMY with Dr. Teresita Fitzgerald    Subjective: Wants to go home to see her family and cats.      Objective:    VITAL SIGNS   /61   Pulse 72   Temp 98.2 °F (36.8 °C) (Oral)   Resp 13   Ht 5' 6\" (1.676 m)   Wt 125 lb (56.7 kg)   LMP 01/04/2023   SpO2 100%   BMI 20.18 kg/m²    Height Height: 5' 6\" (167.6 cm)   Weight Weight: 125 lb (56.7 kg)        Allergies Allergies   Allergen Reactions    Flexeril [Cyclobenzaprine]      Altered mental status Minocycline Anaphylaxis    Toradol [Ketorolac Tromethamine]      Passes out    Antihistamines, Chlorpheniramine-Type Other (See Comments)     Pt states she passes out    Benadryl [Diphenhydramine] Other (See Comments)     Black out    Carbamazepine Other (See Comments)     HYPOTENSION    Dextromethorphan Other (See Comments)     PASSED OUT    Divalproex Sodium Other (See Comments)     HYPOTENSION    Fosphenytoin Other (See Comments)     HYPOTENSION    Hydroxyzine Hcl Other (See Comments)     HYPOTENSION    Lamotrigine Other (See Comments)     HYPOTENSION    Levetiracetam Hives    Morphine And Related Other (See Comments)     CHEST PRESSURE    Other      All seizure meds, except gabapentin. Different reactions with each med  All seizure meds, except gabapentin. Different reactions with each med      Oxcarbazepine Other (See Comments)     AGITATION    Oxycodone-Acetaminophen Other (See Comments)     Aggressiveness     Okay with vicodin dilaudid demerol  Aggressiveness     Okay with vicodin dilaudid demerol  Agressiveness; Okay with Vicodin, Dilaudid, Demerol  Does not like      Reglan [Metoclopramide]      Passes out    Topiramate Other (See Comments)     MIGRANE    Metronidazole Rash     Seizures. Sudafed [Pseudoephedrine Hcl] Anxiety      NPO Status ADULT DIET;  Regular   Isolation No active isolations     LABS   Basic Metabolic Profile Recent Labs     01/24/23  0632 01/25/23  1320    139    103   CO2 24 29   BUN 6* 8   CREATININE <0.5* 0.6   GLUCOSE 91 105*        Complete Blood Count Recent Labs     01/24/23  0632 01/25/23  1320   WBC 7.1 8.2   RBC 4.56 4.70        Coagulation Studies Recent Labs     01/24/23  1845   INR 1.10          MEDICATIONS   Inpatient Medications     folic acid, 1 mg, Oral, Daily    vitamin D, 5,000 Units, Oral, Daily    vitamin B-12, 1,000 mcg, Oral, Daily    docusate sodium, 100 mg, Oral, BID    amphetamine-dextroamphetamine, 10 mg, Oral, Daily    acetaminophen, 1,000 mg, Oral, Q6H    DULoxetine, 60 mg, Oral, Daily    gabapentin, 600 mg, Oral, BID    lacosamide, 50 mg, Oral, BID    sodium chloride flush, 5-40 mL, IntraVENous, 2 times per day    polyethylene glycol, 17 g, Oral, Daily    senna, 2 tablet, Oral, BID    famotidine, 20 mg, Oral, BID **OR** famotidine (PEPCID) injection, 20 mg, IntraVENous, BID    enoxaparin, 40 mg, SubCUTAneous, Daily    lidocaine, 1 patch, TransDERmal, Daily   Infusions    sodium chloride        Antibiotics   Recent Abx Admin        No antibiotic orders with administrations found. CT GUIDED STEREOTACTIC LOCALIZATION   Final Result   1. Intraoperative CT imaging localization during lumbar interbody fusion surgery at L4-L5 as described. XR LUMBAR SPINE (2-3 VIEWS)   Final Result      Intraoperative fluoroscopy was performed. Correlate with procedural note for additional information. FLUORO FOR SURGICAL PROCEDURES   Final Result      Intraoperative fluoroscopy was performed. Correlate with procedural note for additional information. Neurologic Exam:    Mental status: awake/alert, oriented x 4    Musculoskeletal:   Gait: Not tested   Tone: normal   Motor strength: 5/5 throughout all extremities     Incision: dermabond REJI, all are clean dry and intact     Respiratory:  Unlabored respiratory pattern    Abdomen:   Soft, ND   Tenderness to abdominal incision     Cardiovascular:  Warm, well perfused    Assessment   43 yo female POD 3 s/p L4-5 ANTERIOR LUMBAR INTERBODY FUSION WITH PEDICLE SCREW FIXATION, POSSIBLE FACETECTOMY with Dr. Brenna Puentes:  Neurologic exam frequency: q 4   Mobility: PT/OT, activity as tolerated   Diet: ADAT  DVT Prophylaxis: SCDs and SQ lovenox  GI Prophylaxis: pepcid   Bowel Regimen: senokot, glycolax   Pain control: PCA pump discontinued today.  Continue PO roxicodone prn and PO valium for muscle spasms  Incisional Care: cleanse daily with soap/water, pat dry with clean towel   Dispo Planning: discharge home today     Patient was seen and examined with Dr. Yaz Burch who agrees with above assessment and plan. Electronically signed by:  TAYA Alonzo NP, 1/26/2023 1:03 PM   Neurosurgery Nurse Practitioner  [phone]

## 2023-01-26 NOTE — PROGRESS NOTES
PCA pump discontinued and turned off. Required documentation charted at the bedside with charge nurse. Pt getting ready to discharge to home with significant other.

## 2023-02-04 NOTE — H&P
I saw and examined Yogesh Arauz on 1/23/2023. I have reviewed the pre-operative history and physical and discussed the surgical procedure including the risks. There has been no change to the history or physical in the interval time since consent. They wish to proceed with the planned procedure.      Lizeth Dyer MD, PhD  Yudi Carter  Director, [address], [address] (c), [phone] (o) 50 feet

## 2024-06-09 ENCOUNTER — HOSPITAL ENCOUNTER (EMERGENCY)
Age: 39
Discharge: HOME | End: 2024-06-09
Attending: EMERGENCY MEDICINE
Payer: MEDICARE

## 2024-06-09 ENCOUNTER — APPOINTMENT (OUTPATIENT)
Dept: GENERAL RADIOLOGY | Age: 39
End: 2024-06-09
Payer: MEDICARE

## 2024-06-09 VITALS
BODY MASS INDEX: 19.38 KG/M2 | TEMPERATURE: 98 F | HEIGHT: 67 IN | WEIGHT: 123.46 LBS | RESPIRATION RATE: 12 BRPM | SYSTOLIC BLOOD PRESSURE: 110 MMHG | OXYGEN SATURATION: 98 % | HEART RATE: 67 BPM | DIASTOLIC BLOOD PRESSURE: 76 MMHG

## 2024-06-09 DIAGNOSIS — K22.4 ESOPHAGEAL SPASM: Primary | ICD-10-CM

## 2024-06-09 PROCEDURE — 99284 EMERGENCY DEPT VISIT MOD MDM: CPT

## 2024-06-09 PROCEDURE — 71045 X-RAY EXAM CHEST 1 VIEW: CPT

## 2024-06-09 PROCEDURE — 93005 ELECTROCARDIOGRAM TRACING: CPT | Performed by: EMERGENCY MEDICINE

## 2024-06-09 ASSESSMENT — PAIN DESCRIPTION - PAIN TYPE: TYPE: ACUTE PAIN

## 2024-06-09 ASSESSMENT — PAIN DESCRIPTION - FREQUENCY: FREQUENCY: INTERMITTENT

## 2024-06-09 ASSESSMENT — PAIN SCALES - GENERAL: PAINLEVEL_OUTOF10: 0

## 2024-06-09 ASSESSMENT — PAIN DESCRIPTION - LOCATION: LOCATION: CHEST

## 2024-06-09 ASSESSMENT — PAIN - FUNCTIONAL ASSESSMENT: PAIN_FUNCTIONAL_ASSESSMENT: NONE - DENIES PAIN

## 2024-06-09 NOTE — ED TRIAGE NOTES
Pt arrived from home via EMS c/o intermittent chest pain this morning. Pt took SL nitro x4 prior to EMS arrival without relief. Pt states she has esophageal spasms and takes the nitro for that. Pt presently denies chest pain but believes it will come back. A&Ox4, ambulatory independently. VS stable. Respirations even, easy, unlabored on room air. Skin warm and dry. Normal sinus on the monitor.  
weakness, cough/persistent lack of appetite/other (specify)

## 2024-06-09 NOTE — DISCHARGE INSTRUCTIONS
Follow up with your PCP for further evaluation and treatment.   If persistent or worsening symptoms, return to ED.

## 2024-06-09 NOTE — ED NOTES
Pt does not want her port accessed at this time because her pain has subsided and she believes the pain is d/t her esophageal spasms. MD at bedside and had a conversation with the patient regarding treatment today. Pt is agreeable to waiting for EKG and chest Xray results before proceeding with either discharge or further care.

## 2024-06-10 LAB
EKG ATRIAL RATE: 65 BPM
EKG DIAGNOSIS: NORMAL
EKG P AXIS: 61 DEGREES
EKG P-R INTERVAL: 154 MS
EKG Q-T INTERVAL: 436 MS
EKG QRS DURATION: 92 MS
EKG QTC CALCULATION (BAZETT): 453 MS
EKG R AXIS: 78 DEGREES
EKG T AXIS: 66 DEGREES
EKG VENTRICULAR RATE: 65 BPM

## 2024-06-10 PROCEDURE — 93010 ELECTROCARDIOGRAM REPORT: CPT | Performed by: INTERNAL MEDICINE

## 2025-05-20 ENCOUNTER — APPOINTMENT (OUTPATIENT)
Dept: URBAN - METROPOLITAN AREA CLINIC 170 | Facility: CLINIC | Age: 40
Setting detail: DERMATOLOGY
End: 2025-05-20

## 2025-05-20 DIAGNOSIS — L70.0 ACNE VULGARIS: ICD-10-CM

## 2025-05-20 PROCEDURE — ? MDM - TREATMENT GOALS

## 2025-05-20 PROCEDURE — ? FULL BODY SKIN EXAM - DECLINED

## 2025-05-20 PROCEDURE — ? COUNSELING

## 2025-05-20 PROCEDURE — ? PRESCRIPTION

## 2025-05-20 PROCEDURE — 99204 OFFICE O/P NEW MOD 45 MIN: CPT

## 2025-05-20 PROCEDURE — ? PRESCRIPTION MEDICATION MANAGEMENT

## 2025-05-20 PROCEDURE — ? PHOTO-DOCUMENTATION

## 2025-05-20 RX ORDER — MINOCYCLINE HYDROCHLORIDE 100 MG/1
CAPSULE ORAL BID
Qty: 30 | Refills: 2 | Status: ERX | COMMUNITY
Start: 2025-05-20

## 2025-05-20 RX ORDER — CLINDAMYCIN PHOSPHATE 10 MG/G
GEL TOPICAL
Qty: 60 | Refills: 2 | Status: ERX | COMMUNITY
Start: 2025-05-20

## 2025-05-20 RX ADMIN — CLINDAMYCIN PHOSPHATE: 10 GEL TOPICAL at 00:00

## 2025-05-20 RX ADMIN — MINOCYCLINE HYDROCHLORIDE: 100 CAPSULE ORAL at 00:00

## 2025-05-20 ASSESSMENT — LOCATION SIMPLE DESCRIPTION DERM
LOCATION SIMPLE: LEFT CHEEK
LOCATION SIMPLE: RIGHT FOREHEAD

## 2025-05-20 ASSESSMENT — LOCATION DETAILED DESCRIPTION DERM
LOCATION DETAILED: LEFT INFERIOR CENTRAL MALAR CHEEK
LOCATION DETAILED: RIGHT INFERIOR MEDIAL FOREHEAD

## 2025-05-20 ASSESSMENT — LOCATION ZONE DERM: LOCATION ZONE: FACE

## 2025-05-20 NOTE — PROCEDURE: COUNSELING
Azithromycin Pregnancy And Lactation Text: This medication is considered safe during pregnancy and is also secreted in breast milk.
Azithromycin Counseling:  I discussed with the patient the risks of azithromycin including but not limited to GI upset, allergic reaction, drug rash, diarrhea, and yeast infections.
Topical Clindamycin Pregnancy And Lactation Text: This medication is Pregnancy Category B and is considered safe during pregnancy. It is unknown if it is excreted in breast milk.
Azelaic Acid Pregnancy And Lactation Text: This medication is considered safe during pregnancy and breast feeding.
Spironolactone Pregnancy And Lactation Text: This medication can cause feminization of the male fetus and should be avoided during pregnancy. The active metabolite is also found in breast milk.
Tazorac Counseling:  Patient advised that medication is irritating and drying.  Patient may need to apply sparingly and wash off after an hour before eventually leaving it on overnight.  The patient verbalized understanding of the proper use and possible adverse effects of tazorac.  All of the patient's questions and concerns were addressed.
Topical Sulfur Applications Counseling: Topical Sulfur Counseling: Patient counseled that this medication may cause skin irritation or allergic reactions.  In the event of skin irritation, the patient was advised to reduce the amount of the drug applied or use it less frequently.   The patient verbalized understanding of the proper use and possible adverse effects of topical sulfur application.  All of the patient's questions and concerns were addressed.
Tazorac Pregnancy And Lactation Text: This medication is not safe during pregnancy. It is unknown if this medication is excreted in breast milk.
Sarecycline Pregnancy And Lactation Text: This medication is Pregnancy Category D and not consider safe during pregnancy. It is also excreted in breast milk.
Benzoyl Peroxide Pregnancy And Lactation Text: This medication is Pregnancy Category C. It is unknown if benzoyl peroxide is excreted in breast milk.
Erythromycin Counseling:  I discussed with the patient the risks of erythromycin including but not limited to GI upset, allergic reaction, drug rash, diarrhea, increase in liver enzymes, and yeast infections.
Winlevi Counseling:  I discussed with the patient the risks of topical clascoterone including but not limited to erythema, scaling, itching, and stinging. Patient voiced their understanding.
Dapsone Counseling: I discussed with the patient the risks of dapsone including but not limited to hemolytic anemia, agranulocytosis, rashes, methemoglobinemia, kidney failure, peripheral neuropathy, headaches, GI upset, and liver toxicity.  Patients who start dapsone require monitoring including baseline LFTs and weekly CBCs for the first month, then every month thereafter.  The patient verbalized understanding of the proper use and possible adverse effects of dapsone.  All of the patient's questions and concerns were addressed.
Minocycline Counseling: Patient advised regarding possible photosensitivity and discoloration of the teeth, skin, lips, tongue and gums.  Patient instructed to avoid sunlight, if possible.  When exposed to sunlight, patients should wear protective clothing, sunglasses, and sunscreen.  The patient was instructed to call the office immediately if the following severe adverse effects occur:  hearing changes, easy bruising/bleeding, severe headache, or vision changes.  The patient verbalized understanding of the proper use and possible adverse effects of minocycline.  All of the patient's questions and concerns were addressed.
Topical Clindamycin Counseling: Patient counseled that this medication may cause skin irritation or allergic reactions.  In the event of skin irritation, the patient was advised to reduce the amount of the drug applied or use it less frequently.   The patient verbalized understanding of the proper use and possible adverse effects of clindamycin.  All of the patient's questions and concerns were addressed.
Topical Sulfur Applications Pregnancy And Lactation Text: This medication is Pregnancy Category C and has an unknown safety profile during pregnancy. It is unknown if this topical medication is excreted in breast milk.
High Dose Vitamin A Counseling: Side effects reviewed, pt to contact office should one occur.
Use Enhanced Medication Counseling?: No
Isotretinoin Pregnancy And Lactation Text: This medication is Pregnancy Category X and is considered extremely dangerous during pregnancy. It is unknown if it is excreted in breast milk.
Bactrim Pregnancy And Lactation Text: This medication is Pregnancy Category D and is known to cause fetal risk.  It is also excreted in breast milk.
Dapsone Pregnancy And Lactation Text: This medication is Pregnancy Category C and is not considered safe during pregnancy or breast feeding.
Doxycycline Pregnancy And Lactation Text: This medication is Pregnancy Category D and not consider safe during pregnancy. It is also excreted in breast milk but is considered safe for shorter treatment courses.
Bactrim Counseling:  I discussed with the patient the risks of sulfa antibiotics including but not limited to GI upset, allergic reaction, drug rash, diarrhea, dizziness, photosensitivity, and yeast infections.  Rarely, more serious reactions can occur including but not limited to aplastic anemia, agranulocytosis, methemoglobinemia, blood dyscrasias, liver or kidney failure, lung infiltrates or desquamative/blistering drug rashes.
Aklief counseling:  Patient advised to apply a pea-sized amount only at bedtime and wait 30 minutes after washing their face before applying.  If too drying, patient may add a non-comedogenic moisturizer.  The most commonly reported side effects including irritation, redness, scaling, dryness, stinging, burning, itching, and increased risk of sunburn.  The patient verbalized understanding of the proper use and possible adverse effects of retinoids.  All of the patient's questions and concerns were addressed.
Doxycycline Counseling:  Patient counseled regarding possible photosensitivity and increased risk for sunburn.  Patient instructed to avoid sunlight, if possible.  When exposed to sunlight, patients should wear protective clothing, sunglasses, and sunscreen.  The patient was instructed to call the office immediately if the following severe adverse effects occur:  hearing changes, easy bruising/bleeding, severe headache, or vision changes.  The patient verbalized understanding of the proper use and possible adverse effects of doxycycline.  All of the patient's questions and concerns were addressed.
High Dose Vitamin A Pregnancy And Lactation Text: High dose vitamin A therapy is contraindicated during pregnancy and breast feeding.
Azelaic Acid Counseling: Patient counseled that medicine may cause skin irritation and to avoid applying near the eyes.  In the event of skin irritation, the patient was advised to reduce the amount of the drug applied or use it less frequently.   The patient verbalized understanding of the proper use and possible adverse effects of azelaic acid.  All of the patient's questions and concerns were addressed.
Winlevi Pregnancy And Lactation Text: This medication is considered safe during pregnancy and breastfeeding.
Birth Control Pills Pregnancy And Lactation Text: This medication should be avoided if pregnant and for the first 30 days post-partum.
Topical Retinoid counseling:  Patient advised to apply a pea-sized amount only at bedtime and wait 30 minutes after washing their face before applying.  If too drying, patient may add a non-comedogenic moisturizer. The patient verbalized understanding of the proper use and possible adverse effects of retinoids.  All of the patient's questions and concerns were addressed.
Benzoyl Peroxide Counseling: Patient counseled that medicine may cause skin irritation and bleach clothing.  In the event of skin irritation, the patient was advised to reduce the amount of the drug applied or use it less frequently.   The patient verbalized understanding of the proper use and possible adverse effects of benzoyl peroxide.  All of the patient's questions and concerns were addressed.
Sarecycline Counseling: Patient advised regarding possible photosensitivity and discoloration of the teeth, skin, lips, tongue and gums.  Patient instructed to avoid sunlight, if possible.  When exposed to sunlight, patients should wear protective clothing, sunglasses, and sunscreen.  The patient was instructed to call the office immediately if the following severe adverse effects occur:  hearing changes, easy bruising/bleeding, severe headache, or vision changes.  The patient verbalized understanding of the proper use and possible adverse effects of sarecycline.  All of the patient's questions and concerns were addressed.
Topical Retinoid Pregnancy And Lactation Text: This medication is Pregnancy Category C. It is unknown if this medication is excreted in breast milk.
Isotretinoin Counseling: Patient should get monthly blood tests, not donate blood, not drive at night if vision affected, not share medication, and not undergo elective surgery for 6 months after tx completed. Side effects reviewed, pt to contact office should one occur.
Spironolactone Counseling: Patient advised regarding risks of diarrhea, abdominal pain, hyperkalemia, birth defects (for female patients), liver toxicity and renal toxicity. The patient may need blood work to monitor liver and kidney function and potassium levels while on therapy. The patient verbalized understanding of the proper use and possible adverse effects of spironolactone.  All of the patient's questions and concerns were addressed.
Tetracycline Counseling: Patient counseled regarding possible photosensitivity and increased risk for sunburn.  Patient instructed to avoid sunlight, if possible.  When exposed to sunlight, patients should wear protective clothing, sunglasses, and sunscreen.  The patient was instructed to call the office immediately if the following severe adverse effects occur:  hearing changes, easy bruising/bleeding, severe headache, or vision changes.  The patient verbalized understanding of the proper use and possible adverse effects of tetracycline.  All of the patient's questions and concerns were addressed. Patient understands to avoid pregnancy while on therapy due to potential birth defects.
Detail Level: Zone
Erythromycin Pregnancy And Lactation Text: This medication is Pregnancy Category B and is considered safe during pregnancy. It is also excreted in breast milk.
Aklief Pregnancy And Lactation Text: It is unknown if this medication is safe to use during pregnancy.  It is unknown if this medication is excreted in breast milk.  Breastfeeding women should use the topical cream on the smallest area of the skin for the shortest time needed while breastfeeding.  Do not apply to nipple and areola.
Birth Control Pills Counseling: Birth Control Pill Counseling: I discussed with the patient the potential side effects of OCPs including but not limited to increased risk of stroke, heart attack, thrombophlebitis, deep venous thrombosis, hepatic adenomas, breast changes, GI upset, headaches, and depression.  The patient verbalized understanding of the proper use and possible adverse effects of OCPs. All of the patient's questions and concerns were addressed.

## 2025-05-20 NOTE — PROCEDURE: PRESCRIPTION MEDICATION MANAGEMENT
Initiate Treatment: Minocycline 100mg capsules po qd\\nClindamycin 1% topical gel qam
Detail Level: Zone
Render In Strict Bullet Format?: No

## (undated) DEVICE — GLOVE SURG SZ 75 L12IN FNGR THK87MIL DK GRN LTX FREE ISOLEX

## (undated) DEVICE — GLOVE SURG SZ 75 L12IN FNGR THK79MIL GRN LTX FREE

## (undated) DEVICE — SUTURE VCRL SZ 3-0 L36IN ABSRB UD L36MM CT-1 1/2 CIR J944H

## (undated) DEVICE — SLEEVE PROTCT FOR SCRDRVR AND AWL SYNFIX EVOLUTION SYS

## (undated) DEVICE — PACK,UNIVERSAL,NO GOWNS: Brand: MEDLINE

## (undated) DEVICE — SUTURE PDS II SZ 0 L60IN ABSRB VLT L48MM CTX 1/2 CIR Z990G

## (undated) DEVICE — SPONGE,NEURO,0.5"X3",XR,STRL,LF,10/PK: Brand: MEDLINE

## (undated) DEVICE — SPONGE,LAP,18"X18",DLX,XR,ST,5/PK,40/PK: Brand: MEDLINE

## (undated) DEVICE — SUTURE VCRL SZ 3-0 L27IN ABSRB UD L36MM CT-1 1/2 CIR J258H

## (undated) DEVICE — POSITIONER HD REST FRME

## (undated) DEVICE — GLOVE SURG SZ 6 L12IN FNGR THK75MIL WHT LTX POLYMER BEAD

## (undated) DEVICE — SYRINGE IRRIG 60ML SFT PLIABLE BLB EZ TO GRP 1 HND USE W/

## (undated) DEVICE — Device: Brand: DISPOSABLE TROCAR INSERT PEDICLE ACCESS NEEDLE (1 PCS.)

## (undated) DEVICE — COVER LT HNDL CAM BLU DISP W/ SURG CTRL

## (undated) DEVICE — SUTURE VCRL SZ 2-0 L18IN ABSRB UD CT-1 L36MM 1/2 CIR J839D

## (undated) DEVICE — SOLUTION IV 1000ML 0.9% SOD CHL

## (undated) DEVICE — GLOVE SURG SZ 75 L12IN FNGR THK94MIL TRNSLUC YEL LTX

## (undated) DEVICE — GLOVE SURG SZ 8 CRM LTX FREE POLYISOPRENE POLYMER BEAD ANTI

## (undated) DEVICE — LAMINECTOMY: Brand: MEDLINE INDUSTRIES, INC.

## (undated) DEVICE — UNDERGLOVE SURG SZ 8 BLU LTX FREE SYN POLYISOPRENE POLYMER

## (undated) DEVICE — SUTURE PERMAHAND SZ 2-0 L30IN NONABSORBABLE BLK SILK W/O A305H

## (undated) DEVICE — APPLIER LIG CLP M L11IN TI STR RNG HNDL FOR 20 CLP DISP

## (undated) DEVICE — TUBING, SUCTION, 1/4" X 12', STRAIGHT: Brand: MEDLINE

## (undated) DEVICE — SUTURE MCRYL SZ 4-0 L27IN ABSRB UD L19MM PS-2 1/2 CIR PRIM Y426H

## (undated) DEVICE — 3M™ IOBAN™ 2 ANTIMICROBIAL INCISE DRAPE 6648EZ: Brand: IOBAN™ 2

## (undated) DEVICE — SLEEVE PROTCT THRD LCK FOR SYNFIX EVOLUTION SYS

## (undated) DEVICE — SUTURE VIC COAT BR VIO CP2 4-0 18IN J392H

## (undated) DEVICE — ULTRA CMFRT CVR M

## (undated) DEVICE — SUTURE ETHLN SZ 3-0 L30IN NONABSORBABLE BLK L36MM FSLX 3/8 1673BH

## (undated) DEVICE — SHEET, T, LAPAROTOMY, STERILE: Brand: MEDLINE

## (undated) DEVICE — STANDARD HYPODERMIC NEEDLE,POLYPROPYLENE HUB: Brand: MONOJECT

## (undated) DEVICE — CATHETER IV 14GA L5.25IN PERIPH ORNG FEP POLYMER 3 BVL

## (undated) DEVICE — BLADE ES ELASTOMERIC COAT INSUL DURABLE BEND UPTO 90DEG

## (undated) DEVICE — SUTURE VCRL SZ 0 L18IN ABSRB UD L36MM CT-1 1/2 CIR J840D

## (undated) DEVICE — TOOL 14MH30 LEGEND 14CM 3MM: Brand: MIDAS REX ™

## (undated) DEVICE — GLOVE SURG SZ 65 CRM LTX FREE POLYISOPRENE POLYMER BEAD ANTI

## (undated) DEVICE — GLOVE SURG SZ 8 L12IN FNGR THK94MIL TRNSLUC YEL LTX HYDRGEL

## (undated) DEVICE — DRAPE MICSCP W54XL150IN W/ 4 BINOC GLS LENS LEICA

## (undated) DEVICE — DRAPE 33X23IN INCISE ANTIMICROB IOBAN 2

## (undated) DEVICE — CABLE BPLR L12FT FLYING LD DISPOSABLE

## (undated) DEVICE — Z INACTIVE USE 2855094 SPONGE SURG W3 8IN WHT COT RND PNUT DISECT W COUNT CRD RADPQ

## (undated) DEVICE — 3M™ TEGADERM™ TRANSPARENT FILM DRESSING FRAME STYLE, 1626W, 4 IN X 4-3/4 IN (10 CM X 12 CM), 50/CT 4CT/CASE: Brand: 3M™ TEGADERM™

## (undated) DEVICE — SURE SET-DOUBLE BASIN-LF: Brand: MEDLINE INDUSTRIES, INC.

## (undated) DEVICE — APPLIER CLP L9.375IN APER 2.1MM CLS L3.8MM 20 SM TI CLP

## (undated) DEVICE — Device

## (undated) DEVICE — TOWEL,STOP FLAG GOLD N-W: Brand: MEDLINE

## (undated) DEVICE — NEURO FUSION ADD-ON PACK: Brand: MEDLINE INDUSTRIES, INC.

## (undated) DEVICE — ADHESIVE SKIN CLSR 0.7ML TOP DERMBND ADV